# Patient Record
Sex: FEMALE | Race: WHITE | NOT HISPANIC OR LATINO | Employment: FULL TIME | URBAN - NONMETROPOLITAN AREA
[De-identification: names, ages, dates, MRNs, and addresses within clinical notes are randomized per-mention and may not be internally consistent; named-entity substitution may affect disease eponyms.]

---

## 2022-12-04 NOTE — PROGRESS NOTES
"Chief Complaint  Depression, anxiety, PTSD, and difficulty with concentration    Subjective          Alisa Saul presents to [facility] by herself for an initial evaluation.  The patient was referred by Eli Garcia, [facility] from [facility].    History of Present Illness: Alisa states, \"I have been on some form of antidepressant since 2017.  I have been on many different kinds: Lexapro, Zoloft, Effexor, BuSpar, Trintellix, and Wellbutrin.\"  Alisa tells me that it has been disheartening as medications have not been very effective.  She feels that her depression and anxiety have been worsening.  She feels her depression is more severe than anxiety at this time.  She notes severe symptoms of depression including daily depressed mood, anhedonia, difficulty with sleep, fatigue, increased appetite, feelings of guilt, decreased concentration, lack of motivation, lack of energy, avoiding tasks that require sustained effort, and passive suicidal ideations.  She adamantly denies any suicidal plan since 2017 but reports having thoughts such as, \"things would be easier if I was not here\" or \"it may be easier for people if I was not here.\"  She tells me that her last plan included taking multiple medications as a form of suicide.  She is in agreement to safety planning today.  She tells me she has neglected caring for herself in the past which has led to dental problems and poor vision. She is seeing providers now. She also notes that health problems such as back pain and suppurative hidradenitis are affecting her mood. She notes symptoms of moderate anxiety such as constant worry, difficulty controlling her worry, worrying about different things, difficulty relaxing, being easily annoyed or irritable, and feeling as if something awful may happen.  She notes episodes of panic \"a handful of times.\"  She tells me that it can be a combination of feeling anxious and being " overwhelmed.  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  She tells me her last panic episode was the summer.  She tells me they last for about 30 minutes and she feels exhausted afterwards.  She denies any alleviating factors but does try distraction.  She notes symptoms similar to PTSD such as experiencing death during the pandemic, dissociated thoughts, panic attacks, and hypervigilance.  She does not feel there is anything that she tries to avoid at this time.  She is starting graduate school next month and would like to improve her mental health prior to starting.  She is also in therapy with Eli Garcia at [facility].  She tells me that she ha difficulty with sleep since childhood.  She has tried multiple sleep aids including melatonin, Vistaril, Belsomra, trazodone, and Benadryl.  She tells me that she often takes an hour to 2 hours to fall asleep and uses her phone as a distraction.  She tells me that she gets about 4 hours of sleep on average as she wakes throughout the night.  She denies symptoms similar to azucena/hypomania but does note that there has been impulsivity with spending at times.  She tells me that she spends more when she is depressed to improve her mood.  She has had financial difficulties which required her mother to be provide assistance.  This leads to further guilt feelings.  Currently she is taking Wellbutrin XL and BuSpar.  She denies any side effects.  She denies any HI/AVH.    Past Psychiatric History: Alisa tells me that she began medications in 2017 by her primary care physician.  She reports taking medication such as Lexapro, Zoloft, Effexor, BuSpar, Trintellix, Wellbutrin, Vistaril, Belsomra, melatonin, trazodone, and Benadryl.  She [facility] with [facility] in 2021.  She denies any inpatient psychiatric hospitalizations.  She does report having a suicide plan in 2017 but denies any attempts.  She  "also reports fleeting, passive suicidal ideations at this time.  She identifies her mother and cats as protective against suicide.  She also agrees to safety planning.  She denies any self harming behaviors but notes lack of self care in the past.    Substance Use/Abuse: Alisa adamantly denies the current or former use of any illicit substances or nicotine.  She does drink alcohol occasionally.  She began at age 22.  Her use has not increased over time and has not been associated with any legal problems.  She denies any cravings for alcohol    Family Psychiatric History: Alisa notes that her father had depression and problems with addiction prior to her sister's birth 28 years ago.  Her mother has taken Wellbutrin in the past for smoking cessation and possibly mild depression.    Developmental History: Alisa was born and raised in [place], [place].  Her parents  when she was 4 or 5.  She has an older sister by 2 years.  She tells me that her mother raised her primarily after her parents .  She tells me that her father was not often present in her life and would often miss his set weekends with the children.  She tells me her relationship with him has been \"arnol in the past.\"  She tells me she used to be angry with her father but has tried to become more accepting of him and set boundaries.  She reports having the same difficulties with her sister with the need for boundaries.  He identifies her sister as having narcissistic personality.  She is trying to be closer with her mother and confide in her more about her mental health struggles.  She does feel her mother is supportive and she can talk with her about her mental health.  She has always been a good student in the past and enjoyed school.  She has a bachelor's of nursing and is pursuing a CRNA degree.  She will start school in a month.  She tells me that she has always been good with making and keeping friends.  She tells me that her " "friendships often feel \"one-sided and that she is more of a convenience.\"  She recently joined a dating amina and has dated in the past.  There was one relationship about a year ago that was difficult for Alisa after the break-up.  She denies any disciplinary problems in the past.    Social History: Alisa currently lives in [place], [place] by herself.  She has 3 cats and a dog.  She has no biological children.  She is single.  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  She moved from [place] in April 2022.  She enjoys watching movies, and TV, reading, listening to music, cooking occasionally, shopping, and spending time with her animals.  She is returning to school in January at [employer]. Her first few semesters are online.She denies the use of nicotine and reports the occasional use of alcohol.     Objective   Vital Signs:   /78   Pulse 82   Ht 175.3 cm (69\")   Wt 134 kg (295 lb)   BMI 43.56 kg/m²       PHQ-9 Score:   PHQ-9 Total Score: 24     Mental Status Exam:   Hygiene:   good  Cooperation:  Cooperative  Eye Contact:  Fair  Psychomotor Behavior:  Slow  Affect:  tearful  Mood: depressed  Speech:  Normal  Thought Process:  Goal directed and Linear  Thought Content:  Normal  Suicidal:  Suicidal Ideation and passive without intent or plan  Homicidal:  None  Hallucinations:  None  Delusion:  None  Memory:  Intact  Orientation:  Person, Place, Time and Situation  Reliability:  good  Insight:  Fair  Judgement:  Fair  Impulse Control:  Fair  Physical/Medical Issues:  Yes Hidradenitis suppurativa, chronic back pain, endometriosis, and arthritis      Current Medications:   Current Outpatient Medications   Medication Sig Dispense Refill   • buPROPion XL (WELLBUTRIN XL) 300 MG 24 hr tablet Take 300 mg by mouth Every Morning.     • busPIRone (BUSPAR) 10 MG tablet Take 10 mg by mouth 2 (Two) Times a Day.     • fluticasone (FLONASE) 50 MCG/ACT nasal spray 2 sprays " into the nostril(s) as directed by provider Daily.     • Humira Pen 40 MG/0.4ML Pen-injector Kit      • levocetirizine (XYZAL) 5 MG tablet      • Low-Ogestrel 0.3-30 MG-MCG per tablet TAKE 1 TABLET BY MOUTH 1 TIME EACH DAY.     • montelukast (SINGULAIR) 10 MG tablet      • spironolactone (ALDACTONE) 100 MG tablet 150 mg.     • TIZANIDINE HCL PO 4 mg.     • lamoTRIgine (LaMICtal) 25 MG tablet Take 1 tablet by mouth Every Night for 14 days, THEN 2 tablets Every Night for 14 days. 42 tablet 0   • propranolol (INDERAL) 10 MG tablet Take 1 tablet by mouth 2 (Two) Times a Day As Needed (anxiety). 60 tablet 2     No current facility-administered medications for this visit.   Physical Exam  Vitals and nursing note reviewed.   Constitutional:       Appearance: Normal appearance. She is well-developed. She is obese.   Musculoskeletal:         General: Normal range of motion.   Skin:     General: Skin is warm and dry.   Neurological:      Mental Status: She is alert and oriented to person, place, and time.   Psychiatric:         Attention and Perception: Attention normal.         Mood and Affect: Mood is depressed. Affect is tearful.         Speech: Speech normal.         Behavior: Behavior is slowed. Behavior is cooperative.         Thought Content: Thought content includes suicidal (passive without intent or plan) ideation.         Cognition and Memory: Cognition normal.         Judgment: Judgment is impulsive.        Result Review :                 Assessment and Plan    Diagnoses and all orders for this visit:    1. Moderate recurrent major depression (HCC) (Primary)  -     lamoTRIgine (LaMICtal) 25 MG tablet; Take 1 tablet by mouth Every Night for 14 days, THEN 2 tablets Every Night for 14 days.  Dispense: 42 tablet; Refill: 0    2. CRISTINA (generalized anxiety disorder)  -     propranolol (INDERAL) 10 MG tablet; Take 1 tablet by mouth 2 (Two) Times a Day As Needed (anxiety).  Dispense: 60 tablet; Refill: 2    3. Other  insomnia    4. Post traumatic stress disorder (PTSD)         Impression:  -This is an initial evaluation of the patient. Alisa is a , 27-year-old female who presents by herself for a medication evaluation.  Alisa has been experiencing symptoms of depression and anxiety since 2017.  She had a suicide plan at that time and began medication management.  She has been in therapy for greater than a year and feels her medications may need to be adjusted.  She notes symptoms similar to PTSD after nursing through a pandemic.  Alisa and I spent time discussing her symptoms.  She appeared to have symptoms similar to ADHD throughout the visit.  There appears to be difficulties with impulsivity, restlessness, poor focus, decreased concentration, problems with sleep, and difficulties with finding a medication regiment which has improved symptoms.  Alisa and I discussed how many psychiatric symptoms may overlap but she finds comfort in my mention of this diagnosis as she has discussed this with her therapist and primary care provider in the past.  She agrees to completing a CPT-3 test today.  She also noted benefits of starting Wellbutrin on her focus and concentration.  We also discussed her sleep issues and I suggested that she try a dual-action melatonin as Melatonin has been helpful for her in the past but she is taking higher than recommended doses.  I provided dosing and administration instructions.  She agrees. She is familiar with sleep hygiene but can also benefit from turning off her phone an hour before bedtime.  We discussed signing a release so I can communicate with her therapist, Eli.  She agrees.  I also recommended in the future reconsider making dietary changes as she seems to have several inflammatory processes that may benefit from a gluten-free or elimination diet.  She verbalizes understanding.  Eli and I discussed possible medication adjustments to improve symptoms at this time.   In particular, I suggested continuing Wellbutrin and BuSpar and adding lamotrigine and propranolol.  I explained the mechanism of action and purpose of these medications, as well as discussed risk versus benefits.  She verbalizes to monitor for rash and orthostatic hypotension with starting these medications.  She is in agreement to the treatment plan.  -Initiate lamotrigine 25 mg nightly for 2 weeks then increase to 50 mg nightly for depression and anxiety.  -Initiate propranolol 10 mg twice daily as needed for anxiety.  -Continue Wellbutrin 300 mg daily for depression.  -Continue BuSpar 10 mg twice daily for anxiety.  -Continue therapy.  -Obtain a CPT 3 test.  We will discuss results at next visit.  -Sign PATRICK for Eli Garcia, [facility].     TREATMENT PLAN/GOALS: Continue supportive psychotherapy efforts and medications as indicated. Treatment and medication options discussed during today's visit. Patient ackowledged and verbally consented to continue with current treatment plan and was educated on the importance of compliance with treatment and follow-up appointments.    MEDICATION ISSUES:    We discussed risks, benefits, and side effects of the above medications and the patient was agreeable with the plan. Patient was educated on the importance of compliance with treatment and follow-up appointments.  Patient is agreeable to call the office with any worsening of symptoms or onset of side effects. Patient is agreeable to call 911 or go to the nearest ER should he/she begin having SI/HI.      Counseled patient regarding multimodal approach with healthy nutrition, healthy sleep, regular physical activity, social activities, counseling, and medications.      Coping skills reviewed and encouraged positive framing of thoughts     Assisted patient in processing above session content; acknowledged and normalized patient's thoughts, feelings, and concerns.  Applied  positive coping skills and behavior management in  session.  Allowed patient to freely discuss issues without interruption or judgment. Provided safe, confidential environment to facilitate the development of positive therapeutic relationship and encourage open, honest communication. Assisted patient in identifying risk factors which would indicate the need for higher level of care including thoughts to harm self or others and/or self-harming behavior and encouraged patient to contact this office, call 911, or present to the nearest emergency room should any of these events occur. Discussed crisis intervention services and means to access.     MEDS ORDERED DURING VISIT:  New Medications Ordered This Visit   Medications   • lamoTRIgine (LaMICtal) 25 MG tablet     Sig: Take 1 tablet by mouth Every Night for 14 days, THEN 2 tablets Every Night for 14 days.     Dispense:  42 tablet     Refill:  0   • propranolol (INDERAL) 10 MG tablet     Sig: Take 1 tablet by mouth 2 (Two) Times a Day As Needed (anxiety).     Dispense:  60 tablet     Refill:  2           Follow Up   Return in about 4 weeks (around 1/2/2023) for Medication Check.    Patient was given instructions and counseling regarding her condition or for health maintenance advice. Please see specific information pulled into the AVS if appropriate.     This document has been electronically signed by THELMA Hubbard  December 5, 2022 13:34 EST      This document has been electronically signed by THELMA Henriquez, PMHNP-BC  December 5, 2022 13:34 EST    Part of this note may be an electronic transcription/translation of spoken language to printed text using the Dragon Dictation System.

## 2022-12-05 ENCOUNTER — OFFICE VISIT (OUTPATIENT)
Dept: PSYCHIATRY | Facility: CLINIC | Age: 27
End: 2022-12-05

## 2022-12-05 VITALS
HEART RATE: 82 BPM | HEIGHT: 69 IN | WEIGHT: 293 LBS | SYSTOLIC BLOOD PRESSURE: 104 MMHG | DIASTOLIC BLOOD PRESSURE: 78 MMHG | BODY MASS INDEX: 43.4 KG/M2

## 2022-12-05 DIAGNOSIS — F33.1 MODERATE RECURRENT MAJOR DEPRESSION: Primary | Chronic | ICD-10-CM

## 2022-12-05 DIAGNOSIS — F41.1 GAD (GENERALIZED ANXIETY DISORDER): Chronic | ICD-10-CM

## 2022-12-05 DIAGNOSIS — G47.09 OTHER INSOMNIA: Chronic | ICD-10-CM

## 2022-12-05 DIAGNOSIS — F43.10 POST TRAUMATIC STRESS DISORDER (PTSD): Chronic | ICD-10-CM

## 2022-12-05 PROBLEM — G47.00 INSOMNIA: Status: ACTIVE | Noted: 2022-09-23

## 2022-12-05 PROBLEM — F32.A DEPRESSION: Status: ACTIVE | Noted: 2022-09-23

## 2022-12-05 PROBLEM — N80.9 ENDOMETRIOSIS: Status: ACTIVE | Noted: 2022-09-23

## 2022-12-05 PROBLEM — R53.83 FATIGUE: Status: ACTIVE | Noted: 2022-09-23

## 2022-12-05 PROBLEM — E66.01 MORBID OBESITY WITH BODY MASS INDEX (BMI) OF 40.0 OR HIGHER: Status: ACTIVE | Noted: 2022-09-23

## 2022-12-05 PROBLEM — M54.50 LOW BACK PAIN: Status: ACTIVE | Noted: 2022-09-23

## 2022-12-05 PROBLEM — F41.9 ANXIETY: Status: ACTIVE | Noted: 2022-09-23

## 2022-12-05 PROBLEM — J02.9 SORE THROAT: Status: ACTIVE | Noted: 2022-09-23

## 2022-12-05 PROBLEM — J30.9 ALLERGIC RHINITIS: Status: ACTIVE | Noted: 2022-09-23

## 2022-12-05 PROBLEM — L73.2 HIDRADENITIS SUPPURATIVA: Status: ACTIVE | Noted: 2022-09-23

## 2022-12-05 PROBLEM — M19.90 ARTHRITIS: Status: ACTIVE | Noted: 2022-09-23

## 2022-12-05 PROCEDURE — 90792 PSYCH DIAG EVAL W/MED SRVCS: CPT | Performed by: NURSE PRACTITIONER

## 2022-12-05 RX ORDER — ADALIMUMAB 40MG/0.4ML
KIT SUBCUTANEOUS
COMMUNITY
Start: 2022-11-12

## 2022-12-05 RX ORDER — LEVOCETIRIZINE DIHYDROCHLORIDE 5 MG/1
TABLET, FILM COATED ORAL
COMMUNITY
End: 2023-01-09

## 2022-12-05 RX ORDER — FLUTICASONE PROPIONATE 50 MCG
2 SPRAY, SUSPENSION (ML) NASAL DAILY
COMMUNITY
End: 2023-02-08

## 2022-12-05 RX ORDER — LEVOCETIRIZINE DIHYDROCHLORIDE 5 MG/1
TABLET, FILM COATED ORAL
COMMUNITY
End: 2022-12-05 | Stop reason: SDUPTHER

## 2022-12-05 RX ORDER — BUPROPION HYDROCHLORIDE 300 MG/1
300 TABLET ORAL EVERY MORNING
COMMUNITY
Start: 2022-10-12 | End: 2023-01-02 | Stop reason: SDUPTHER

## 2022-12-05 RX ORDER — PROPRANOLOL HYDROCHLORIDE 10 MG/1
10 TABLET ORAL 2 TIMES DAILY PRN
Qty: 60 TABLET | Refills: 2 | Status: SHIPPED | OUTPATIENT
Start: 2022-12-05 | End: 2023-03-01

## 2022-12-05 RX ORDER — LAMOTRIGINE 25 MG/1
TABLET ORAL
Qty: 42 TABLET | Refills: 0 | Status: SHIPPED | OUTPATIENT
Start: 2022-12-05 | End: 2022-12-27

## 2022-12-05 RX ORDER — MONTELUKAST SODIUM 10 MG/1
TABLET ORAL
COMMUNITY
End: 2023-01-09

## 2022-12-05 RX ORDER — SPIRONOLACTONE 100 MG/1
150 TABLET, FILM COATED ORAL
COMMUNITY
End: 2023-01-09

## 2022-12-05 RX ORDER — NORGESTREL AND ETHINYL ESTRADIOL 0.3-0.03MG
KIT ORAL
COMMUNITY
Start: 2022-09-23

## 2022-12-05 RX ORDER — BUSPIRONE HYDROCHLORIDE 10 MG/1
10 TABLET ORAL 2 TIMES DAILY
COMMUNITY
Start: 2022-10-06 | End: 2023-01-09 | Stop reason: SDUPTHER

## 2022-12-25 DIAGNOSIS — F33.1 MODERATE RECURRENT MAJOR DEPRESSION: Chronic | ICD-10-CM

## 2022-12-27 RX ORDER — LAMOTRIGINE 100 MG/1
TABLET ORAL
Qty: 42 TABLET | Refills: 0 | Status: SHIPPED | OUTPATIENT
Start: 2022-12-27 | End: 2023-02-08 | Stop reason: SDUPTHER

## 2022-12-27 NOTE — TELEPHONE ENCOUNTER
Okay. I will send in the 100 mg tablets which she will take 1/2 tablet for a week and then increase to whole tablet.

## 2022-12-27 NOTE — TELEPHONE ENCOUNTER
Can we see how Alisa is doing at the 50 mg dose and if she is ready to increase once more in a week to 100 mg?

## 2023-01-02 DIAGNOSIS — F33.1 MODERATE RECURRENT MAJOR DEPRESSION: Primary | ICD-10-CM

## 2023-01-02 DIAGNOSIS — G47.09 OTHER INSOMNIA: ICD-10-CM

## 2023-01-02 RX ORDER — MIRTAZAPINE 15 MG/1
15 TABLET, FILM COATED ORAL NIGHTLY
Qty: 30 TABLET | Refills: 2 | Status: SHIPPED | OUTPATIENT
Start: 2023-01-02 | End: 2023-03-09

## 2023-01-02 RX ORDER — BUPROPION HYDROCHLORIDE 150 MG/1
150 TABLET ORAL EVERY MORNING
Qty: 30 TABLET | Refills: 2 | Status: SHIPPED | OUTPATIENT
Start: 2023-01-02 | End: 2023-03-09 | Stop reason: SDUPTHER

## 2023-01-08 NOTE — PROGRESS NOTES
Chief Complaint  Depression, anxiety, PTSD, and difficulty with concentration      Subjective          Alisa Saul presents to [facility] by herself for a follow up and medication check.    History of Present Illness: Alisa states, \"I have been okay.\"  Alisa tells me that she flies to [place] tomorrow for her graduate school orientation.  She will be there for 3 days.  She reports feelings of nervousness, being overwhelmed, and anxiety.\"  She has not been able to see a improvement with the addition of propranolol.  She continues to rate her anxiety as moderate.  She reports continuing to lack energy and motivation and focus.  She describes her depression as severe.  She endorses passive suicidal ideations but adamantly denies any intent or plan for suicide.  She completed a CPT 3 test which indicated a moderate likelihood of having a disorder characterized by symptoms of inattentiveness.  She remains motivated to make medication adjustments to improve her symptoms.  She continues in therapy with Eli Garcia.  Her notes were received and scanned into the media. She has tried: Lexapro, Zoloft, Effexor, BuSpar, Trintellix, and Wellbutrin. Currently she is taking Propranolol, Mirtazapine, Lamotrigine, Wellbutrin XL and BuSpar.  She denies any side effects.  She denies any HI/AVH.    Current Medications:   Current Outpatient Medications   Medication Sig Dispense Refill   • buPROPion XL (WELLBUTRIN XL) 150 MG 24 hr tablet Take 1 tablet by mouth Every Morning. 30 tablet 2   • busPIRone (BUSPAR) 15 MG tablet Take 1 tablet by mouth 2 (Two) Times a Day. 60 tablet 2   • fluticasone (FLONASE) 50 MCG/ACT nasal spray 2 sprays into the nostril(s) as directed by provider Daily.     • Humira Pen 40 MG/0.4ML Pen-injector Kit      • lamoTRIgine (LaMICtal) 100 MG tablet Take 0.5 tablets by mouth Every Night for 7 days, THEN 1 tablet Every Night for 23 days. 42 tablet 0   •  Low-Ogestrel 0.3-30 MG-MCG per tablet TAKE 1 TABLET BY MOUTH 1 TIME EACH DAY.     • mirtazapine (Remeron) 15 MG tablet Take 1 tablet by mouth Every Night. 30 tablet 2   • propranolol (INDERAL) 10 MG tablet Take 1 tablet by mouth 2 (Two) Times a Day As Needed (anxiety). 60 tablet 2   • TIZANIDINE HCL PO 4 mg.     • amphetamine-dextroamphetamine (Adderall) 10 MG tablet Take 1 tablet by mouth Daily for 7 days, THEN 1 tablet 2 (Two) Times a Day for 7 days. 21 tablet 0     No current facility-administered medications for this visit.         Objective   Vital Signs:   /72   Pulse 78   Ht 172.7 cm (68\")   Wt 136 kg (300 lb)   BMI 45.61 kg/m²     Physical Exam  Vitals and nursing note reviewed.   Constitutional:       Appearance: Normal appearance. She is well-developed.   Musculoskeletal:         General: Normal range of motion.   Skin:     General: Skin is warm and dry.   Neurological:      Mental Status: She is alert and oriented to person, place, and time.   Psychiatric:         Attention and Perception: Attention normal.         Mood and Affect: Mood is anxious and depressed.         Speech: Speech normal.         Behavior: Behavior normal. Behavior is cooperative.         Thought Content: Thought content includes suicidal (passive without intent or plan) ideation.         Cognition and Memory: Cognition normal.         Judgment: Judgment normal.        Result Review :     The following data was reviewed by: THELMA Hubbard on 01/09/2023:    BEHAVIORAL HEALTH - SCAN - CPT TEST (12/05/2022)           Assessment and Plan    Diagnoses and all orders for this visit:    1. ADHD (attention deficit hyperactivity disorder), inattentive type (Primary)  -     amphetamine-dextroamphetamine (Adderall) 10 MG tablet; Take 1 tablet by mouth Daily for 7 days, THEN 1 tablet 2 (Two) Times a Day for 7 days.  Dispense: 21 tablet; Refill: 0    2. CRISTINA (generalized anxiety disorder)  -     busPIRone (BUSPAR) 15 MG  tablet; Take 1 tablet by mouth 2 (Two) Times a Day.  Dispense: 60 tablet; Refill: 2    3. Medication management  -     Urine Drug Screen - Urine, Clean Catch; Future    4. Moderate recurrent major depression (HCC)    5. Post traumatic stress disorder (PTSD)         Mental Status Exam:   Hygiene:   good  Cooperation:  Cooperative  Eye Contact:  Good  Psychomotor Behavior:  Appropriate  Affect:  Appropriate  Mood: depressed and anxious  Speech:  Normal  Thought Process:  Goal directed and Linear  Thought Content:  Normal  Suicidal:  Suicidal Ideation and Passive without intent or plan  Homicidal:  None  Hallucinations:  None  Delusion:  None  Memory:  Intact  Orientation:  Person, Place, Time and Situation  Reliability:  good  Insight:  Good  Judgement:  Good  Impulse Control:  Good  Physical/Medical Issues:  Yes Hidradenitis suppurativa, chronic back pain, endometriosis, and arthritis      PHQ-9 Score:   PHQ-9 Total Score: 23    Impression/Plan:  -This is a follow up and medication check. Alisa continues to experience severe depression and moderate anxiety.  She is unsure of the medication changes made at last visit have been beneficial.  She is still motivated to continue with adjustments.  She has been taking 150 mg of Wellbutrin and 50 mg of lamotrigine and will be increasing Lamotrigine tomorrow.  She has been using propranolol but does not feel any effects.  She completed a CPT 3 test and was found to have a moderate likelihood of having a disorder characterized by symptoms of inattentiveness.  Therefore, we will begin treating her ADHD symptoms with a stimulant.  We did discuss noncontrolled versus controlled options and she feels the stimulant may be most beneficial for her.  We discussed using Adderall initially and changing to Vyvanse since she is scheduled to fly to [place].  The shorter acting medication may be more effective at managing symptoms.  She agrees.  She is aware of the risk of adding a  stimulant to her treatment including sudden cardiac death, dry mouth/constipation, insomnia, and decreased appetite to name a few.  She will continue with the current doses of her medications except we will increase BuSpar to help with symptoms of anxiety.  She verbalizes understanding.  She will continue therapy with Eli.  -Initiate Adderall 10 mg daily for a week and increase to twice daily for ADHD, inattentive type  -Decrease Mirtazapine to 7.5 mg nightly for insomnia.   -Increase BuSpar to 15 mg twice daily for anxiety.  -Continue lamotrigine 100 mg nightly for depression and anxiety  -Continue propranolol 10 mg twice daily as needed for anxiety.  -Continue Wellbutrin 150 mg daily for depression.  -Continue therapy.  -Obtain urine drug screen.  I am expecting the patient to be negative for all substances.  -The DESIRAE report, reviewed through PDMP, of the past 12 months were reviewed and is appropriate.  The patient/guardian reports taking the medication only as prescribed.  The patient/guardian denies any abuse or misuse of the medication.  The patient/guardian denies any other substance use or issues.  There are no apparent substance related issues.  The patient reports no side effects of the current medication usage.  The patient/guardian has reported significant improvement with medication usage and wishes to continue medication as prescribed.  The patient/guardian is appropriate to continue with current medication usage at this time.  Reinforced risks and side effects of medication usage, patient and/or guardian verbalize understanding in their own words and are in agreement with current plan.  -The patient has read and signed the [facility] Controlled Substance Contract. We discussed the risks and benefits of the use of controlled substances, including the risk of tolerance and drug dependence. Anorectic medications can be prescribed by one provider at a time and dispensed from one facility at a  time, they can only be taken as prescribed, and we are not obligated to refill them if lost or stolen. The refills are only during regular clinic hours. [place] report was pulled on patient, reviewed and found to be appropriate.       MEDS ORDERED DURING VISIT:  New Medications Ordered This Visit   Medications   • busPIRone (BUSPAR) 15 MG tablet     Sig: Take 1 tablet by mouth 2 (Two) Times a Day.     Dispense:  60 tablet     Refill:  2   • amphetamine-dextroamphetamine (Adderall) 10 MG tablet     Sig: Take 1 tablet by mouth Daily for 7 days, THEN 1 tablet 2 (Two) Times a Day for 7 days.     Dispense:  21 tablet     Refill:  0         Follow Up   Return in about 4 weeks (around 2/6/2023) for Medication Check.  Patient was given instructions and counseling regarding her condition or for health maintenance advice. Please see specific information pulled into the AVS if appropriate.       TREATMENT PLAN/GOALS: Continue supportive psychotherapy efforts and medications as indicated. Treatment and medication options discussed during today's visit. Patient acknowledged and verbally consented to continue with current treatment plan and was educated on the importance of compliance with treatment and follow-up appointments.    MEDICATION ISSUES:  Discussed medication options and treatment plan of prescribed medication as well as the risks, benefits, and side effects including potential falls, possible impaired driving and metabolic adversities among others. Patient is agreeable to call the office with any worsening of symptoms or onset of side effects. Patient is agreeable to call 911 or go to the nearest ER should he/she begin having SI/HI.        This document has been electronically signed by THELMA Henriquez, PMHNP-BC  January 9, 2023 12:01 EST    Part of this note may be an electronic transcription/translation of spoken language to printed text using the Dragon Dictation System.

## 2023-01-09 ENCOUNTER — LAB (OUTPATIENT)
Dept: LAB | Facility: HOSPITAL | Age: 28
End: 2023-01-09
Payer: COMMERCIAL

## 2023-01-09 ENCOUNTER — OFFICE VISIT (OUTPATIENT)
Dept: PSYCHIATRY | Facility: CLINIC | Age: 28
End: 2023-01-09
Payer: COMMERCIAL

## 2023-01-09 VITALS
DIASTOLIC BLOOD PRESSURE: 72 MMHG | BODY MASS INDEX: 44.41 KG/M2 | SYSTOLIC BLOOD PRESSURE: 118 MMHG | WEIGHT: 293 LBS | HEIGHT: 68 IN | HEART RATE: 78 BPM

## 2023-01-09 DIAGNOSIS — Z79.899 MEDICATION MANAGEMENT: ICD-10-CM

## 2023-01-09 DIAGNOSIS — F43.10 POST TRAUMATIC STRESS DISORDER (PTSD): Chronic | ICD-10-CM

## 2023-01-09 DIAGNOSIS — F41.1 GAD (GENERALIZED ANXIETY DISORDER): Chronic | ICD-10-CM

## 2023-01-09 DIAGNOSIS — F90.0 ADHD (ATTENTION DEFICIT HYPERACTIVITY DISORDER), INATTENTIVE TYPE: Primary | ICD-10-CM

## 2023-01-09 DIAGNOSIS — F33.1 MODERATE RECURRENT MAJOR DEPRESSION: Chronic | ICD-10-CM

## 2023-01-09 LAB
AMPHET+METHAMPHET UR QL: NEGATIVE
AMPHETAMINES UR QL: NEGATIVE
BARBITURATES UR QL SCN: NEGATIVE
BENZODIAZ UR QL SCN: NEGATIVE
BUPRENORPHINE SERPL-MCNC: NEGATIVE NG/ML
CANNABINOIDS SERPL QL: NEGATIVE
COCAINE UR QL: NEGATIVE
METHADONE UR QL SCN: NEGATIVE
OPIATES UR QL: NEGATIVE
OXYCODONE UR QL SCN: NEGATIVE
PCP UR QL SCN: NEGATIVE
PROPOXYPH UR QL: NEGATIVE
TRICYCLICS UR QL SCN: NEGATIVE

## 2023-01-09 PROCEDURE — 80306 DRUG TEST PRSMV INSTRMNT: CPT

## 2023-01-09 PROCEDURE — 99214 OFFICE O/P EST MOD 30 MIN: CPT | Performed by: NURSE PRACTITIONER

## 2023-01-09 RX ORDER — BUSPIRONE HYDROCHLORIDE 15 MG/1
15 TABLET ORAL 2 TIMES DAILY
Qty: 60 TABLET | Refills: 2 | Status: SHIPPED | OUTPATIENT
Start: 2023-01-09

## 2023-01-09 RX ORDER — DEXTROAMPHETAMINE SACCHARATE, AMPHETAMINE ASPARTATE, DEXTROAMPHETAMINE SULFATE AND AMPHETAMINE SULFATE 2.5; 2.5; 2.5; 2.5 MG/1; MG/1; MG/1; MG/1
TABLET ORAL
Qty: 21 TABLET | Refills: 0 | Status: SHIPPED | OUTPATIENT
Start: 2023-01-09 | End: 2023-01-23

## 2023-01-16 DIAGNOSIS — F33.1 MODERATE RECURRENT MAJOR DEPRESSION: Chronic | ICD-10-CM

## 2023-01-16 RX ORDER — LAMOTRIGINE 100 MG/1
TABLET ORAL
Qty: 42 TABLET | Refills: 0 | OUTPATIENT
Start: 2023-01-16 | End: 2023-02-15

## 2023-01-25 DIAGNOSIS — F90.0 ADHD (ATTENTION DEFICIT HYPERACTIVITY DISORDER), INATTENTIVE TYPE: ICD-10-CM

## 2023-01-25 RX ORDER — DEXTROAMPHETAMINE SACCHARATE, AMPHETAMINE ASPARTATE, DEXTROAMPHETAMINE SULFATE AND AMPHETAMINE SULFATE 2.5; 2.5; 2.5; 2.5 MG/1; MG/1; MG/1; MG/1
TABLET ORAL
Qty: 21 TABLET | Refills: 0 | Status: CANCELLED | OUTPATIENT
Start: 2023-01-25 | End: 2023-02-08

## 2023-01-25 RX ORDER — DEXTROAMPHETAMINE SACCHARATE, AMPHETAMINE ASPARTATE MONOHYDRATE, DEXTROAMPHETAMINE SULFATE AND AMPHETAMINE SULFATE 5; 5; 5; 5 MG/1; MG/1; MG/1; MG/1
20 CAPSULE, EXTENDED RELEASE ORAL DAILY
Qty: 30 CAPSULE | Refills: 0 | Status: SHIPPED | OUTPATIENT
Start: 2023-01-25 | End: 2023-02-27

## 2023-01-25 NOTE — TELEPHONE ENCOUNTER
Called pt to verify medication. She thinks it helped some but not much of a change very minimal. Please Advise    Rx Refill Note  Requested Prescriptions     Pending Prescriptions Disp Refills   • amphetamine-dextroamphetamine (Adderall) 10 MG tablet 21 tablet 0     Sig: Take 1 tablet by mouth Daily for 7 days, THEN 1 tablet 2 (Two) Times a Day for 7 days.      Last office visit with prescribing clinician: 1/9/2023   Last telemedicine visit with prescribing clinician: 2/8/2023   Next office visit with prescribing clinician: 2/8/2023                         Would you like a call back once the refill request has been completed: [] Yes [] No    If the office needs to give you a call back, can they leave a voicemail: [] Yes [] No    Lindsay Downing CMA  01/25/23, 14:12 EST

## 2023-01-25 NOTE — TELEPHONE ENCOUNTER
If she has seen small changes but no problems. I would suggest the next dose adjustment is to change to XR 20 mg. If she agrees, I will send to pharmacy.

## 2023-02-05 NOTE — PROGRESS NOTES
"This provider is located at [facility] ,[address], [address] in [place], [place],using a secure MyChart Video Visit through [employer]. Patient is being seen remotely via telehealth at their home address in [place], and stated they are in a secure environment for this session. The patient's condition being diagnosed/treated is appropriate for telemedicine. The provider identified herself as well as her credentials.   The patient, and/or patients guardian, consent to be seen remotely, and when consent is given they understand that the consent allows for patient identifiable information to be sent to a third party as needed.   They may refuse to be seen remotely at any time. The electronic data is encrypted and password protected, and the patient and/or guardian has been advised of the potential risks to privacy not withstanding such measures.    Chief Complaint  Depression, anxiety, PTSD, and difficulty with concentration      Lincoln Saul presents via MyChart Video through [employer] by herself for a follow up and medication check.    History of Present Illness: Alisa states, \"I am feeling overwhelmed.\" Alisa tells me she is taking graduate courses and working full-time. She is hoping to go part-time but is getting the run around from her manager about whether this is an option for her. She tells me the worry has been \"horrible\" and she is not doing the best emotionally because of the unknowns of the future. She is trying to establish a routine but has not found a schedule that works. She is not wanting to get out of bed in the mornings and finds herself only having enough time to dress, let dog out, and drive to work. She is not sleeping well and feels restless at night. She does not feels she is getting into a REM sleep until the morning. She may be getting 2-5 hours total. She is not taking Mirtazapine for fear she may oversleep. Her appetite is " increased from stress. She finds the Adderall provides some focus and ability to complete tasks but not as much as she was hoping.  She has tried: Lexapro, Zoloft, Effexor, BuSpar, Trintellix, and Wellbutrin. Currently she is taking Adderall XR. Propranolol, Mirtazapine, Lamotrigine, Wellbutrin XL and BuSpar.  She denies any side effects.  She denies any HI/AVH.    Current Medications:   Current Outpatient Medications   Medication Sig Dispense Refill   • amphetamine-dextroamphetamine XR (ADDERALL XR) 20 MG 24 hr capsule Take 1 capsule by mouth Daily 30 capsule 0   • buPROPion XL (WELLBUTRIN XL) 150 MG 24 hr tablet Take 1 tablet by mouth Every Morning. 30 tablet 2   • busPIRone (BUSPAR) 15 MG tablet Take 1 tablet by mouth 2 (Two) Times a Day. 60 tablet 2   • doxycycline (MONODOX) 100 MG capsule TAKE 1 CAPSULE BY MOUTH 2 TIMES AS NEEDED     • Humira Pen 40 MG/0.4ML Pen-injector Kit      • lamoTRIgine (LaMICtal) 100 MG tablet Take 1 tablet by mouth Every Night. 30 tablet 2   • levocetirizine (XYZAL) 5 MG tablet Take 5 mg by mouth.     • Low-Ogestrel 0.3-30 MG-MCG per tablet TAKE 1 TABLET BY MOUTH 1 TIME EACH DAY.     • mirtazapine (Remeron) 15 MG tablet Take 1 tablet by mouth Every Night. 30 tablet 2   • ondansetron ODT (ZOFRAN-ODT) 4 MG disintegrating tablet 1 TABLET BY MOUTH EVERY SIX HOURS AS NEEDED FOR NAUSEA VOMITING     • promethazine (PHENERGAN) 25 MG tablet Take 25 mg by mouth Every 6 (Six) Hours As Needed.     • Promethegan 25 MG suppository UNWRAP AND INSERT 1 SUPPOSITORY RECTALLY EVERY 6 HOURS AS NEEDED     • propranolol (INDERAL) 10 MG tablet Take 1 tablet by mouth 2 (Two) Times a Day As Needed (anxiety). 60 tablet 2   • TIZANIDINE HCL PO 4 mg.       No current facility-administered medications for this visit.         Objective   Vital Signs:   There were no vitals taken for this visit.    Physical Exam  Nursing note reviewed. Vitals reviewed: No vitals due to telehealth visit.   Constitutional:        Appearance: Normal appearance. She is well-developed. She is obese.   Neurological:      Mental Status: She is alert and oriented to person, place, and time.   Psychiatric:         Attention and Perception: Attention normal.         Mood and Affect: Mood is anxious and depressed. Affect is tearful (tries to hold back tears).         Speech: Speech normal.         Behavior: Behavior normal. Behavior is cooperative.         Thought Content: Thought content includes suicidal (passive without intent or plan) ideation.         Cognition and Memory: Cognition normal.         Judgment: Judgment normal.        Result Review :     The following data was reviewed by: THELMA Hubbard on 02/07/2023:         Assessment and Plan    Diagnoses and all orders for this visit:    1. Moderate recurrent major depression (HCC) (Primary)  -     lamoTRIgine (LaMICtal) 100 MG tablet; Take 1 tablet by mouth Every Night.  Dispense: 30 tablet; Refill: 2    2. ADHD (attention deficit hyperactivity disorder), inattentive type    3. CRISTINA (generalized anxiety disorder)    4. Post traumatic stress disorder (PTSD)         Mental Status Exam:   Hygiene:   good  Cooperation:  Cooperative  Eye Contact:  Good  Psychomotor Behavior:  Appropriate  Affect:  Restricted and Tries to hold back tears  Mood: depressed and anxious  Speech:  Normal  Thought Process:  Goal directed and Linear  Thought Content:  Normal  Suicidal:  Suicidal Ideation and Passive without intent or plan  Homicidal:  None  Hallucinations:  None  Delusion:  None  Memory:  Intact  Orientation:  Person, Place, Time and Situation  Reliability:  good  Insight:  Good  Judgement:  Good  Impulse Control:  Good  Physical/Medical Issues:  Yes Hidradenitis suppurativa, chronic back pain, endometriosis, and arthritis      PHQ-9 Score:   PHQ-9 Total Score:      Impression/Plan:  -This is a follow up and medication check. Alisa is struggling with feeling overwhelmed after returning  to graduate school and working full-time. She had planned on going part-time but is unsure if this will be possible now. She wants to take one day at a time and knows that school is a priority for her. She is not sleeping well and is struggling to get out of bed in the mornings. She is not using Mirtazapine because she fears she will oversleep but she is not sleeping well. I provided education about the effects of stress on the body and ways to combat stress with diet and exercise. I encouraged her to use 1/2 tablet of Mirtazapine nightly to improve sleep and we will meet back in 4 weeks to assess. She may need an increase of Adderall XR at next refill and will let this provider know.   -Continue Adderall XR 20 daily for ADHD, inattentive type. Patient has refills.   -Continue Mirtazapine 7.5 mg nightly for insomnia. Patient has refills.   -Continue BuSpar 15 mg twice daily for anxiety. Patient has refills.   -Continue lamotrigine 100 mg nightly for depression and anxiety  -Continue propranolol 10 mg twice daily as needed for anxiety. Patient has refills.   -Continue Wellbutrin 150 mg daily for depression. Patient has refills.   -Continue therapy.  -Last urine drug screen 01/09/2923.  Appropriate.  -The DESIRAE report, reviewed through PDMP, of the past 12 months were reviewed and is appropriate.  The patient/guardian reports taking the medication only as prescribed.  The patient/guardian denies any abuse or misuse of the medication.  The patient/guardian denies any other substance use or issues.  There are no apparent substance related issues.  The patient reports no side effects of the current medication usage.  The patient/guardian has reported significant improvement with medication usage and wishes to continue medication as prescribed.  The patient/guardian is appropriate to continue with current medication usage at this time.  Reinforced risks and side effects of medication usage, patient and/or guardian  verbalize understanding in their own words and are in agreement with current plan.        MEDS ORDERED DURING VISIT:  New Medications Ordered This Visit   Medications   • lamoTRIgine (LaMICtal) 100 MG tablet     Sig: Take 1 tablet by mouth Every Night.     Dispense:  30 tablet     Refill:  2         Follow Up   Return in about 4 weeks (around 3/8/2023) for Medication Check.  Patient was given instructions and counseling regarding her condition or for health maintenance advice. Please see specific information pulled into the AVS if appropriate.       TREATMENT PLAN/GOALS: Continue supportive psychotherapy efforts and medications as indicated. Treatment and medication options discussed during today's visit. Patient acknowledged and verbally consented to continue with current treatment plan and was educated on the importance of compliance with treatment and follow-up appointments.    MEDICATION ISSUES:  Discussed medication options and treatment plan of prescribed medication as well as the risks, benefits, and side effects including potential falls, possible impaired driving and metabolic adversities among others. Patient is agreeable to call the office with any worsening of symptoms or onset of side effects. Patient is agreeable to call 911 or go to the nearest ER should he/she begin having SI/HI.        This document has been electronically signed by THELMA Henriquez, PMHNP-BC  February 8, 2023 08:53 EST    Part of this note may be an electronic transcription/translation of spoken language to printed text using the Dragon Dictation System.

## 2023-02-08 ENCOUNTER — TELEMEDICINE (OUTPATIENT)
Dept: PSYCHIATRY | Facility: CLINIC | Age: 28
End: 2023-02-08
Payer: COMMERCIAL

## 2023-02-08 DIAGNOSIS — F41.1 GAD (GENERALIZED ANXIETY DISORDER): ICD-10-CM

## 2023-02-08 DIAGNOSIS — F43.10 POST TRAUMATIC STRESS DISORDER (PTSD): Chronic | ICD-10-CM

## 2023-02-08 DIAGNOSIS — F90.0 ADHD (ATTENTION DEFICIT HYPERACTIVITY DISORDER), INATTENTIVE TYPE: ICD-10-CM

## 2023-02-08 DIAGNOSIS — F33.1 MODERATE RECURRENT MAJOR DEPRESSION: Primary | Chronic | ICD-10-CM

## 2023-02-08 PROCEDURE — 99214 OFFICE O/P EST MOD 30 MIN: CPT | Performed by: NURSE PRACTITIONER

## 2023-02-08 RX ORDER — LEVOCETIRIZINE DIHYDROCHLORIDE 5 MG/1
5 TABLET, FILM COATED ORAL
COMMUNITY
Start: 2023-01-27

## 2023-02-08 RX ORDER — DOXYCYCLINE 100 MG/1
CAPSULE ORAL
COMMUNITY
Start: 2023-01-26

## 2023-02-08 RX ORDER — PROMETHAZINE HYDROCHLORIDE 25 MG/1
SUPPOSITORY RECTAL
COMMUNITY
Start: 2023-01-20

## 2023-02-08 RX ORDER — PROMETHAZINE HYDROCHLORIDE 25 MG/1
25 TABLET ORAL EVERY 6 HOURS PRN
COMMUNITY
Start: 2023-01-20

## 2023-02-08 RX ORDER — LAMOTRIGINE 100 MG/1
100 TABLET ORAL NIGHTLY
Qty: 30 TABLET | Refills: 2 | Status: SHIPPED | OUTPATIENT
Start: 2023-02-08 | End: 2023-03-09 | Stop reason: SDUPTHER

## 2023-02-08 RX ORDER — ONDANSETRON 4 MG/1
TABLET, ORALLY DISINTEGRATING ORAL
COMMUNITY
Start: 2022-11-15

## 2023-02-25 DIAGNOSIS — F90.0 ADHD (ATTENTION DEFICIT HYPERACTIVITY DISORDER), INATTENTIVE TYPE: ICD-10-CM

## 2023-02-25 RX ORDER — DEXTROAMPHETAMINE SACCHARATE, AMPHETAMINE ASPARTATE MONOHYDRATE, DEXTROAMPHETAMINE SULFATE AND AMPHETAMINE SULFATE 5; 5; 5; 5 MG/1; MG/1; MG/1; MG/1
20 CAPSULE, EXTENDED RELEASE ORAL DAILY
Qty: 30 CAPSULE | Refills: 0 | Status: CANCELLED | OUTPATIENT
Start: 2023-02-25 | End: 2024-02-25

## 2023-02-27 RX ORDER — DEXTROAMPHETAMINE SACCHARATE, AMPHETAMINE ASPARTATE MONOHYDRATE, DEXTROAMPHETAMINE SULFATE AND AMPHETAMINE SULFATE 6.25; 6.25; 6.25; 6.25 MG/1; MG/1; MG/1; MG/1
25 CAPSULE, EXTENDED RELEASE ORAL DAILY
Qty: 30 CAPSULE | Refills: 0 | Status: SHIPPED | OUTPATIENT
Start: 2023-02-27 | End: 2023-04-01 | Stop reason: SDUPTHER

## 2023-03-01 DIAGNOSIS — F41.1 GAD (GENERALIZED ANXIETY DISORDER): Chronic | ICD-10-CM

## 2023-03-01 RX ORDER — PROPRANOLOL HYDROCHLORIDE 10 MG/1
10 TABLET ORAL 2 TIMES DAILY PRN
Qty: 180 TABLET | Refills: 0 | Status: SHIPPED | OUTPATIENT
Start: 2023-03-01

## 2023-03-05 NOTE — PROGRESS NOTES
"This provider is located at [facility], Behavioral Health ,[address], [address] in [place], [place],using a secure We[employer] Video Visit through [employer]. Patient is being seen remotely via telehealth at their home address in [place], and stated they are in a secure environment for this session. The patient's condition being diagnosed/treated is appropriate for telemedicine. The provider identified herself as well as her credentials.   The patient, and/or patients guardian, consent to be seen remotely, and when consent is given they understand that the consent allows for patient identifiable information to be sent to a third party as needed.   They may refuse to be seen remotely at any time. The electronic data is encrypted and password protected, and the patient and/or guardian has been advised of the potential risks to privacy not withstanding such measures.    Chief Complaint  Depression, anxiety, PTSD, and difficulty with concentration      Subjective          Alisa Saul presents via MyChart Video through [employer] by herself for a follow up and medication check.    History of Present Illness: Alisa states, \"I am alright.\" Alisa tells me she is feeling very stressed recently as she is moving forward in school and will be moving sooner than expected to [place] and resigning from her job. She is very anxious and having trouble making decisions, focusing, and organizing thoughts. She is not sleeping well so she tried Mirtazapine more regularly and reports adversities. She felt \"spaced out, not focused, and more dissociated.\" She stop and is finally feeling better. She elected to use dual-action Melatonin for sleep and this is working well so far. She rates her depression a 5 out of 10 with 10 being the highest. She rates her anxiety a 7 out of 10 with 10 being the highest. She feels medications are helping and finally noticed Lamotrigine leveling out her mood. She would like more " motivation. She reports a flare up of acne always affects her mood and this happened two weeks ago. She reports compliance with medications. She has tried: Mirtazapine, Lexapro, Zoloft, Effexor, BuSpar, Trintellix, and Wellbutrin. Currently she is taking Adderall XR. Propranolol, Lamotrigine, Wellbutrin XL and BuSpar.  She denies any side effects.  She denies any SI/HI/AVH.    Current Medications:   Current Outpatient Medications   Medication Sig Dispense Refill   • amphetamine-dextroamphetamine XR (ADDERALL XR) 25 MG 24 hr capsule Take 1 capsule by mouth Daily 30 capsule 0   • buPROPion XL (WELLBUTRIN XL) 150 MG 24 hr tablet Take 1 tablet by mouth Every Morning. 30 tablet 2   • busPIRone (BUSPAR) 15 MG tablet Take 1 tablet by mouth 2 (Two) Times a Day. 60 tablet 2   • Humira Pen 40 MG/0.4ML Pen-injector Kit      • lamoTRIgine (LaMICtal) 150 MG tablet Take 1 tablet by mouth Every Night. 30 tablet 2   • levocetirizine (XYZAL) 5 MG tablet Take 1 tablet by mouth.     • Low-Ogestrel 0.3-30 MG-MCG per tablet TAKE 1 TABLET BY MOUTH 1 TIME EACH DAY.     • ondansetron ODT (ZOFRAN-ODT) 4 MG disintegrating tablet 1 TABLET BY MOUTH EVERY SIX HOURS AS NEEDED FOR NAUSEA VOMITING     • promethazine (PHENERGAN) 25 MG tablet Take 1 tablet by mouth Every 6 (Six) Hours As Needed.     • Promethegan 25 MG suppository UNWRAP AND INSERT 1 SUPPOSITORY RECTALLY EVERY 6 HOURS AS NEEDED     • propranolol (INDERAL) 10 MG tablet TAKE 1 TABLET BY MOUTH 2 (TWO) TIMES A DAY AS NEEDED (ANXIETY). 180 tablet 0   • TIZANIDINE HCL PO 4 mg.     • doxycycline (MONODOX) 100 MG capsule TAKE 1 CAPSULE BY MOUTH 2 TIMES AS NEEDED       No current facility-administered medications for this visit.         Objective   Vital Signs:   There were no vitals taken for this visit.    Physical Exam  Nursing note reviewed. Vitals reviewed: No vitals due to telehealth visit.   Constitutional:       Appearance: Normal appearance. She is well-developed. She is obese.    Neurological:      Mental Status: She is alert and oriented to person, place, and time.   Psychiatric:         Attention and Perception: Attention normal.         Mood and Affect: Mood is anxious and depressed. Affect is tearful (tries to hold back tears).         Speech: Speech normal.         Behavior: Behavior normal. Behavior is cooperative.         Thought Content: Thought content normal.         Cognition and Memory: Cognition normal.         Judgment: Judgment normal.        Result Review :     The following data was reviewed by: THELMA Hubbard on 03/09/2023:         Assessment and Plan    Diagnoses and all orders for this visit:    1. CRISTINA (generalized anxiety disorder) (Primary)    2. Moderate recurrent major depression (HCC)  -     lamoTRIgine (LaMICtal) 150 MG tablet; Take 1 tablet by mouth Every Night.  Dispense: 30 tablet; Refill: 2  -     buPROPion XL (WELLBUTRIN XL) 150 MG 24 hr tablet; Take 1 tablet by mouth Every Morning.  Dispense: 30 tablet; Refill: 2    3. ADHD (attention deficit hyperactivity disorder), inattentive type    4. Post traumatic stress disorder (PTSD)    5. Other insomnia         Mental Status Exam:   Hygiene:   good  Cooperation:  Cooperative  Eye Contact:  Good  Psychomotor Behavior:  Appropriate  Affect:  Restricted and Tries to hold back tears  Mood: depressed and anxious  Speech:  Normal  Thought Process:  Goal directed and Linear  Thought Content:  Normal  Suicidal:  None  Homicidal:  None  Hallucinations:  None  Delusion:  None  Memory:  Intact  Orientation:  Person, Place, Time and Situation  Reliability:  good  Insight:  Good  Judgement:  Good  Impulse Control:  Good  Physical/Medical Issues:  Yes Hidradenitis suppurativa, chronic back pain, endometriosis, and arthritis      PHQ-9 Score:   PHQ-9 Total Score:      Impression/Plan:  -This is a follow up and medication check. Alisa reports ongoing depression and anxiety. Stressors are significant and  interrupting her ability to focus, make decisions, and organize. She needs to clean her home, pack to move, work, and do homework. She is feeling overwhemed and behind. She had friends offer to help and she is considering this as an option. I also suggested finding a person to help her clean and organize before the move. She may consider this. I provided education on the effects of anxiety on executive functioning and encouraged her to use her support systems during this trying time. She is not going to use Mirtazapine for sleep and will use Melatonin. She is finding Lamotrigine to be helpful so I will increase to 150 mg per day. She agrees. We will meet back every 4 weeks until her move to [place]. She is aware she must be present in [place] to continue care.   -Stop Mirtazapine due to adverse effects.   -Increase lamotrigine to 150 mg nightly for depression and anxiety.  -Continue Adderall XR 20 daily for ADHD, inattentive type. Patient has refills.   -Continue BuSpar 15 mg twice daily for anxiety. Patient has refills.   -Continue propranolol 10 mg twice daily as needed for anxiety. Patient has refills.   -Continue Wellbutrin 150 mg daily for depression.   -Continue therapy.  -Last urine drug screen 01/09/2923.  Appropriate.  -The DESIRAE report, reviewed through PDMP, of the past 12 months were reviewed and is appropriate.  The patient/guardian reports taking the medication only as prescribed.  The patient/guardian denies any abuse or misuse of the medication.  The patient/guardian denies any other substance use or issues.  There are no apparent substance related issues.  The patient reports no side effects of the current medication usage.  The patient/guardian has reported significant improvement with medication usage and wishes to continue medication as prescribed.  The patient/guardian is appropriate to continue with current medication usage at this time.  Reinforced risks and side effects of medication usage,  patient and/or guardian verbalize understanding in their own words and are in agreement with current plan.        MEDS ORDERED DURING VISIT:  New Medications Ordered This Visit   Medications   • lamoTRIgine (LaMICtal) 150 MG tablet     Sig: Take 1 tablet by mouth Every Night.     Dispense:  30 tablet     Refill:  2   • buPROPion XL (WELLBUTRIN XL) 150 MG 24 hr tablet     Sig: Take 1 tablet by mouth Every Morning.     Dispense:  30 tablet     Refill:  2         Follow Up   Return in about 4 weeks (around 4/6/2023) for Medication Check.  Patient was given instructions and counseling regarding her condition or for health maintenance advice. Please see specific information pulled into the AVS if appropriate.       TREATMENT PLAN/GOALS: Continue supportive psychotherapy efforts and medications as indicated. Treatment and medication options discussed during today's visit. Patient acknowledged and verbally consented to continue with current treatment plan and was educated on the importance of compliance with treatment and follow-up appointments.    MEDICATION ISSUES:  Discussed medication options and treatment plan of prescribed medication as well as the risks, benefits, and side effects including potential falls, possible impaired driving and metabolic adversities among others. Patient is agreeable to call the office with any worsening of symptoms or onset of side effects. Patient is agreeable to call 911 or go to the nearest ER should he/she begin having SI/HI.        This document has been electronically signed by THELMA Henriquez, PMHNP-BC  March 9, 2023 12:21 EST    Part of this note may be an electronic transcription/translation of spoken language to printed text using the Dragon Dictation System.

## 2023-03-09 ENCOUNTER — TELEMEDICINE (OUTPATIENT)
Dept: PSYCHIATRY | Facility: CLINIC | Age: 28
End: 2023-03-09
Payer: COMMERCIAL

## 2023-03-09 DIAGNOSIS — F90.0 ADHD (ATTENTION DEFICIT HYPERACTIVITY DISORDER), INATTENTIVE TYPE: ICD-10-CM

## 2023-03-09 DIAGNOSIS — F33.1 MODERATE RECURRENT MAJOR DEPRESSION: Chronic | ICD-10-CM

## 2023-03-09 DIAGNOSIS — F41.1 GAD (GENERALIZED ANXIETY DISORDER): Primary | Chronic | ICD-10-CM

## 2023-03-09 DIAGNOSIS — G47.09 OTHER INSOMNIA: Chronic | ICD-10-CM

## 2023-03-09 DIAGNOSIS — F43.10 POST TRAUMATIC STRESS DISORDER (PTSD): Chronic | ICD-10-CM

## 2023-03-09 PROCEDURE — 99214 OFFICE O/P EST MOD 30 MIN: CPT | Performed by: NURSE PRACTITIONER

## 2023-03-09 RX ORDER — BUPROPION HYDROCHLORIDE 150 MG/1
150 TABLET ORAL EVERY MORNING
Qty: 30 TABLET | Refills: 2 | Status: SHIPPED | OUTPATIENT
Start: 2023-03-09

## 2023-03-09 RX ORDER — LAMOTRIGINE 150 MG/1
150 TABLET ORAL NIGHTLY
Qty: 30 TABLET | Refills: 2 | Status: SHIPPED | OUTPATIENT
Start: 2023-03-09

## 2023-04-01 DIAGNOSIS — F90.0 ADHD (ATTENTION DEFICIT HYPERACTIVITY DISORDER), INATTENTIVE TYPE: ICD-10-CM

## 2023-04-04 RX ORDER — DEXTROAMPHETAMINE SACCHARATE, AMPHETAMINE ASPARTATE MONOHYDRATE, DEXTROAMPHETAMINE SULFATE AND AMPHETAMINE SULFATE 6.25; 6.25; 6.25; 6.25 MG/1; MG/1; MG/1; MG/1
25 CAPSULE, EXTENDED RELEASE ORAL DAILY
Qty: 30 CAPSULE | Refills: 0 | Status: SHIPPED | OUTPATIENT
Start: 2023-04-04 | End: 2023-04-12

## 2023-04-10 NOTE — PROGRESS NOTES
"This provider is located at [facility], Behavioral Health ,[address], [address] in [place], [place],using a secure MyChart Video Visit through [employer]. Patient is being seen remotely via telehealth at their home address in [place], and stated they are in a secure environment for this session. The patient's condition being diagnosed/treated is appropriate for telemedicine. The provider identified herself as well as her credentials.   The patient, and/or patients guardian, consent to be seen remotely, and when consent is given they understand that the consent allows for patient identifiable information to be sent to a third party as needed.   They may refuse to be seen remotely at any time. The electronic data is encrypted and password protected, and the patient and/or guardian has been advised of the potential risks to privacy not withstanding such measures.    Chief Complaint  Depression, anxiety, PTSD, and difficulty with concentration      Subjective          Alisa Saul presents via MyChart Video through [employer] by herself for a follow up and medication check.    History of Present Illness: Alisa states, \"I've been better. I'm not doing super well.\" She says that she feels overwhelmed with school but also moving. She is in the process of moving to another city. She says that she just hasn't felt good. She feels down. She has trouble focusing. She says that her memory is bad and she is having trouble with concentration. She rates her depression a 7 or 8 on a scale of 0-10. She says that her anxiety feels like it is what is should be for all that is going on. She is in school, working full time as a nurse, and moving. She reports having mood swings. She reports not sleeping enough. She reports having negative thoughts about herself and guilt feelings, but denies suicidal ideations or thoughts of self-harm. She has took Benadryl a \"few times\" in the past 2 weeks for sleep. She " "reports she has also took Melatonin a few times, also for sleep. She has been taking Singulair for allergies. She is having issues with seasonal allergies, as well as being allergic to her cat. She reports taking propanolol every day before work. She reports that her appetite is good. She was tearful at times during the visit. She also expressed that she has not asked for any help with packing/moving, even though her friends have offered, but she is starting to accept help when offered. She reports that she called in to work today, but feels guilty. She states that she called in because of feeling overwhelmed. She also expressed that she feels bad because she missed a group project assignment on Monday with some girls from school, but she completely forgot about it. She endorses guiltfeelings and stated that the \"girls\" were very upset. She reports compliance with medications. She has tried: Mirtazapine, Lexapro, Zoloft, Effexor, BuSpar, Trintellix, and Wellbutrin. Currently she is taking Adderall XR. Propranolol, Lamotrigine, Wellbutrin XL and BuSpar.  She denies any side effects.  She denies any SI/HI/AVH.    Current Medications:   Current Outpatient Medications   Medication Sig Dispense Refill   • buPROPion XL (WELLBUTRIN XL) 150 MG 24 hr tablet Take 1 tablet by mouth Every Morning. 30 tablet 2   • busPIRone (BUSPAR) 15 MG tablet Take 1 tablet by mouth 2 (Two) Times a Day. 60 tablet 2   • Humira Pen 40 MG/0.4ML Pen-injector Kit      • lamoTRIgine (LaMICtal) 200 MG tablet Take 1 tablet by mouth Every Night. 30 tablet 2   • levocetirizine (XYZAL) 5 MG tablet Take 1 tablet by mouth.     • Low-Ogestrel 0.3-30 MG-MCG per tablet TAKE 1 TABLET BY MOUTH 1 TIME EACH DAY.     • montelukast (SINGULAIR) 10 MG tablet Take 1 tablet by mouth Every Night.     • propranolol (INDERAL) 10 MG tablet TAKE 1 TABLET BY MOUTH 2 (TWO) TIMES A DAY AS NEEDED (ANXIETY). 180 tablet 0   • TIZANIDINE HCL PO 4 mg.     • doxycycline (MONODOX) " 100 MG capsule TAKE 1 CAPSULE BY MOUTH 2 TIMES AS NEEDED (Patient not taking: Reported on 4/12/2023)     • lisdexamfetamine (Vyvanse) 60 MG capsule Take 1 capsule by mouth Every Morning 30 capsule 0   • ondansetron ODT (ZOFRAN-ODT) 4 MG disintegrating tablet 1 TABLET BY MOUTH EVERY SIX HOURS AS NEEDED FOR NAUSEA VOMITING (Patient not taking: Reported on 4/12/2023)     • promethazine (PHENERGAN) 25 MG tablet Take 1 tablet by mouth Every 6 (Six) Hours As Needed. (Patient not taking: Reported on 4/12/2023)     • Promethegan 25 MG suppository UNWRAP AND INSERT 1 SUPPOSITORY RECTALLY EVERY 6 HOURS AS NEEDED (Patient not taking: Reported on 4/12/2023)       No current facility-administered medications for this visit.         Objective   Vital Signs:   There were no vitals taken for this visit.    Physical Exam  Nursing note reviewed. Vitals reviewed: No vitals due to telehealth visit.   Constitutional:       Appearance: Normal appearance. She is well-developed. She is obese.   Neurological:      Mental Status: She is alert and oriented to person, place, and time.   Psychiatric:         Attention and Perception: Attention and perception normal.         Mood and Affect: Mood is anxious and depressed. Affect is tearful (tries to hold back tears).         Speech: Speech normal.         Behavior: Behavior normal. Behavior is cooperative.         Thought Content: Thought content normal.         Cognition and Memory: Cognition normal. She exhibits impaired recent memory.         Judgment: Judgment normal.        Result Review :     The following data was reviewed by: THELMA Hubbard on 04/12/2023:         Assessment and Plan    Diagnoses and all orders for this visit:    1. Moderate recurrent major depression (Primary)  -     lamoTRIgine (LaMICtal) 200 MG tablet; Take 1 tablet by mouth Every Night.  Dispense: 30 tablet; Refill: 2    2. CRISTINA (generalized anxiety disorder)  -     busPIRone (BUSPAR) 15 MG tablet;  "Take 1 tablet by mouth 2 (Two) Times a Day.  Dispense: 60 tablet; Refill: 2    3. ADHD (attention deficit hyperactivity disorder), inattentive type  -     lisdexamfetamine (Vyvanse) 60 MG capsule; Take 1 capsule by mouth Every Morning  Dispense: 30 capsule; Refill: 0    4. Post traumatic stress disorder (PTSD)    5. Other insomnia         Mental Status Exam:   Hygiene:   good  Cooperation:  Cooperative  Eye Contact:  Good  Psychomotor Behavior:  Appropriate  Affect:  Restricted and Tries to hold back tears  Mood: depressed and anxious  Speech:  Normal  Thought Process:  Goal directed and Linear  Thought Content:  Normal  Suicidal:  None  Homicidal:  None  Hallucinations:  None  Delusion:  None  Memory:  Intact  Orientation:  Person, Place, Time and Situation  Reliability:  good  Insight:  Good  Judgement:  Good  Impulse Control:  Good  Physical/Medical Issues:  Yes Hidradenitis suppurativa, chronic back pain, endometriosis, and arthritis      PHQ-9 Score:   PHQ-9 Total Score:      Impression/Plan:  -This is a follow up and medication check. Alisa reports feeling more down, rating depression 7 or 8 on scale of 0-10. She has trouble with memory, stating she has a \"bad memory.\" She feels that her anxiety is in proportion to what she is going through. She feels that the Adderall has helped her anxiety more than anything. She has not been sleeping enough and having more negative thoughts. Discussed asking for help and using her resources for packing and moving. She said that her friends have been offering to help and she is trying to let them help if offered. Discussed the possibility of Singulair causing depression as a side effect or Propranolol. Discussed increasing Wellbutrin and lamotrigine for depression. Discussed side effects and effectiveness. Patient advised to call with any questions, concerns, or adverse effects with medication changes. Talked with patient about changing from Adderall to Vyvanse for ADHD. " Discussed Vyvanse being smoother and a more natural feeling. Pt verbalized understanding.  -Stop taking Adderall XR 20 daily for ADHD, inattentive type.    -Initiate Vyvanse 60 mg daily for ADHD. Prescription sent to pharmacy.  -Increase lamotrigine to 200 mg nightly for depression and anxiety.  Prescription sent to pharmacy.  -Increase Wellbutrin to 300 mg daily for depression. Prescription sent to pharmacy.  -Continue BuSpar 15 mg twice daily for anxiety. Patient has refills.   -Continue propranolol 10 mg twice daily as needed for anxiety.   Patient has refills.   -Continue therapy.  - Follow up visit with medication management 3 weeks.  -Last urine drug screen 01/09/2923.  Appropriate.  -The DESIRAE report, reviewed through PDMP, of the past 12 months were reviewed and is appropriate.  The patient/guardian reports taking the medication only as prescribed.  The patient/guardian denies any abuse or misuse of the medication.  The patient/guardian denies any other substance use or issues.  There are no apparent substance related issues.  The patient reports no side effects of the current medication usage.  The patient/guardian has reported significant improvement with medication usage and wishes to continue medication as prescribed.  The patient/guardian is appropriate to continue with current medication usage at this time.  Reinforced risks and side effects of medication usage, patient and/or guardian verbalize understanding in their own words and are in agreement with current plan.        MEDS ORDERED DURING VISIT:  New Medications Ordered This Visit   Medications   • busPIRone (BUSPAR) 15 MG tablet     Sig: Take 1 tablet by mouth 2 (Two) Times a Day.     Dispense:  60 tablet     Refill:  2   • lamoTRIgine (LaMICtal) 200 MG tablet     Sig: Take 1 tablet by mouth Every Night.     Dispense:  30 tablet     Refill:  2   • lisdexamfetamine (Vyvanse) 60 MG capsule     Sig: Take 1 capsule by mouth Every Morning      Dispense:  30 capsule     Refill:  0         Follow Up   Return in about 3 weeks (around 5/3/2023) for Medication Check, Next scheduled follow up, Medication management.  Patient was given instructions and counseling regarding her condition or for health maintenance advice. Please see specific information pulled into the AVS if appropriate.       TREATMENT PLAN/GOALS: Continue supportive psychotherapy efforts and medications as indicated. Treatment and medication options discussed during today's visit. Patient acknowledged and verbally consented to continue with current treatment plan and was educated on the importance of compliance with treatment and follow-up appointments.    MEDICATION ISSUES:  Discussed medication options and treatment plan of prescribed medication as well as the risks, benefits, and side effects including potential falls, possible impaired driving and metabolic adversities among others. Patient is agreeable to call the office with any worsening of symptoms or onset of side effects. Patient is agreeable to call 911 or go to the nearest ER should he/she begin having SI/HI.        This document has been electronically signed by THELMA Henriquez, PMHNP-BC  April 12, 2023 11:55 EDT    Part of this note may be an electronic transcription/translation of spoken language to printed text using the Dragon Dictation System.    This encounter note was scribed for THELMA Hubbard  by Angie Mcgrath, student NP.  I, THELMA Hubbard , personally performed the services described in this documentation as scribed by the above named individual in my presence, and it is both accurate and complete.     04/12/2023  11:55 EDT

## 2023-04-12 ENCOUNTER — TELEMEDICINE (OUTPATIENT)
Dept: PSYCHIATRY | Facility: CLINIC | Age: 28
End: 2023-04-12
Payer: COMMERCIAL

## 2023-04-12 DIAGNOSIS — F90.0 ADHD (ATTENTION DEFICIT HYPERACTIVITY DISORDER), INATTENTIVE TYPE: ICD-10-CM

## 2023-04-12 DIAGNOSIS — F41.1 GAD (GENERALIZED ANXIETY DISORDER): Chronic | ICD-10-CM

## 2023-04-12 DIAGNOSIS — G47.09 OTHER INSOMNIA: Chronic | ICD-10-CM

## 2023-04-12 DIAGNOSIS — F33.1 MODERATE RECURRENT MAJOR DEPRESSION: Primary | Chronic | ICD-10-CM

## 2023-04-12 DIAGNOSIS — F43.10 POST TRAUMATIC STRESS DISORDER (PTSD): Chronic | ICD-10-CM

## 2023-04-12 PROCEDURE — 99214 OFFICE O/P EST MOD 30 MIN: CPT | Performed by: NURSE PRACTITIONER

## 2023-04-12 RX ORDER — LAMOTRIGINE 200 MG/1
200 TABLET ORAL NIGHTLY
Qty: 30 TABLET | Refills: 2 | Status: SHIPPED | OUTPATIENT
Start: 2023-04-12

## 2023-04-12 RX ORDER — MONTELUKAST SODIUM 10 MG/1
10 TABLET ORAL NIGHTLY
COMMUNITY
Start: 2023-04-03

## 2023-04-12 RX ORDER — BUSPIRONE HYDROCHLORIDE 15 MG/1
15 TABLET ORAL 2 TIMES DAILY
Qty: 60 TABLET | Refills: 2 | Status: SHIPPED | OUTPATIENT
Start: 2023-04-12

## 2023-04-23 DIAGNOSIS — F33.1 MODERATE RECURRENT MAJOR DEPRESSION: Chronic | ICD-10-CM

## 2023-04-24 RX ORDER — LAMOTRIGINE 200 MG/1
TABLET ORAL
Qty: 90 TABLET | Refills: 1 | Status: SHIPPED | OUTPATIENT
Start: 2023-04-24

## 2023-04-24 RX ORDER — LAMOTRIGINE 100 MG/1
TABLET ORAL
Qty: 90 TABLET | OUTPATIENT
Start: 2023-04-24

## 2023-04-24 RX ORDER — LAMOTRIGINE 150 MG/1
TABLET ORAL
Qty: 90 TABLET | OUTPATIENT
Start: 2023-04-24

## 2023-04-30 NOTE — PROGRESS NOTES
"This provider is located at [facility], Behavioral Health ,[address], [address] in [place], [place],using a secure [employer] Video Visit through [employer]. Patient is being seen remotely via telehealth at their home address in [place], and stated they are in a secure environment for this session. The patient's condition being diagnosed/treated is appropriate for telemedicine. The provider identified herself as well as her credentials.   The patient, and/or patients guardian, consent to be seen remotely, and when consent is given they understand that the consent allows for patient identifiable information to be sent to a third party as needed.   They may refuse to be seen remotely at any time. The electronic data is encrypted and password protected, and the patient and/or guardian has been advised of the potential risks to privacy not withstanding such measures.    Chief Complaint  Depression, anxiety, PTSD, and difficulty with concentration      Subjective          Alisa Saul presents via MyChart Video through [employer] by herself for a follow up and medication check.    History of Present Illness: Alisa states, \"I have been busy.\" Alisa tells me she completed her last day of work Monday. She is busy completing school assignments and preparing to move to [place] next week. She continues to have the support of her family. She elected to stop therapy while she is in [place] and may continue in the future. She tells me she felt relived after inforrning her therapist since her mother is paying. She tells me her mood has been low and was very low during her last therapy session and she did not find it to be helpful. She reports passive suicidal ideations with thoughts such as \"maybe it would be better.\" She reports the stress catching up with her and she has been panicked recently. She tries to distract herself. If she catches it early, deep breathing helps her. She tries to stop her " worry and compartmentalizes when she is overly anxious. She is sleeping better. She reports poor appetite. She feels fatigue throughout the day. She feels her focus, concentration, and attention is doing better since the change to Vyvanse. She saw her PCP before leaving for concerns of possible gastric ulcer and was prescribed Protonix.  She reports compliance with medications. She has tried: Mirtazapine, Lexapro, Zoloft, Effexor, BuSpar, Trintellix, and Wellbutrin. Currently, she is taking Vyvanse, Propranolol, Lamotrigine, Wellbutrin XL and BuSpar.  She denies any side effects.  She denies any SI/HI/AVH.    Current Medications:   Current Outpatient Medications   Medication Sig Dispense Refill   • albuterol sulfate  (90 Base) MCG/ACT inhaler Inhale 2 puffs.     • buPROPion XL (WELLBUTRIN XL) 300 MG 24 hr tablet Take 1 tablet by mouth Every Morning. 30 tablet 2   • busPIRone (BUSPAR) 15 MG tablet Take 1 tablet by mouth 2 (Two) Times a Day. 60 tablet 2   • doxycycline (MONODOX) 100 MG capsule      • Humira Pen 40 MG/0.4ML Pen-injector Kit      • lamoTRIgine (LaMICtal) 200 MG tablet TAKE 1 TABLET BY MOUTH EVERY DAY AT NIGHT 90 tablet 1   • levocetirizine (XYZAL) 5 MG tablet Take 1 tablet by mouth.     • Low-Ogestrel 0.3-30 MG-MCG per tablet TAKE 1 TABLET BY MOUTH 1 TIME EACH DAY.     • montelukast (SINGULAIR) 10 MG tablet Take 1 tablet by mouth Every Night.     • ondansetron ODT (ZOFRAN-ODT) 4 MG disintegrating tablet      • pantoprazole (PROTONIX) 40 MG EC tablet Take 1 tablet by mouth.     • promethazine (PHENERGAN) 25 MG tablet Take 1 tablet by mouth Every 6 (Six) Hours As Needed.     • Promethegan 25 MG suppository      • citalopram (CeleXA) 10 MG tablet Take 1 tablet by mouth Daily for 14 days, THEN 2 tablets Daily for 14 days. 42 tablet 0   • lisdexamfetamine (Vyvanse) 70 MG capsule Take 1 capsule by mouth Every Morning 30 capsule 0   • TIZANIDINE HCL PO 4 mg. (Patient not taking: Reported on 5/3/2023)        No current facility-administered medications for this visit.         Objective   Vital Signs:   There were no vitals taken for this visit.    Physical Exam  Nursing note reviewed. Vitals reviewed: No vitals due to telehealth visit.   Constitutional:       Appearance: Normal appearance. She is well-developed. She is obese.   Neurological:      Mental Status: She is alert and oriented to person, place, and time.   Psychiatric:         Attention and Perception: Attention and perception normal.         Mood and Affect: Mood is anxious and depressed. Affect is tearful (tries to hold back tears).         Speech: Speech normal.         Behavior: Behavior normal. Behavior is cooperative.         Thought Content: Thought content includes suicidal (passive without intent or plan) ideation.         Cognition and Memory: Cognition and memory normal.         Judgment: Judgment normal.        Result Review :     The following data was reviewed by: THELMA Hubbard on 05/03/2023:         Assessment and Plan    Diagnoses and all orders for this visit:    1. ADHD (attention deficit hyperactivity disorder), inattentive type (Primary)  -     lisdexamfetamine (Vyvanse) 70 MG capsule; Take 1 capsule by mouth Every Morning  Dispense: 30 capsule; Refill: 0    2. CRISTINA (generalized anxiety disorder)  -     citalopram (CeleXA) 10 MG tablet; Take 1 tablet by mouth Daily for 14 days, THEN 2 tablets Daily for 14 days.  Dispense: 42 tablet; Refill: 0    3. Moderate recurrent major depression  -     citalopram (CeleXA) 10 MG tablet; Take 1 tablet by mouth Daily for 14 days, THEN 2 tablets Daily for 14 days.  Dispense: 42 tablet; Refill: 0  -     buPROPion XL (WELLBUTRIN XL) 300 MG 24 hr tablet; Take 1 tablet by mouth Every Morning.  Dispense: 30 tablet; Refill: 2    4. Post traumatic stress disorder (PTSD)    5. Other insomnia         Mental Status Exam:   Hygiene:   good  Cooperation:  Cooperative  Eye Contact:   "Good  Psychomotor Behavior:  Appropriate  Affect:  Restricted and Tries to hold back tears  Mood: depressed and anxious  Speech:  Normal  Thought Process:  Goal directed and Linear  Thought Content:  Normal  Suicidal:  Suicidal Ideation and passive without intent or plan  Homicidal:  None  Hallucinations:  None  Delusion:  None  Memory:  Intact  Orientation:  Person, Place, Time and Situation  Reliability:  good  Insight:  Good  Judgement:  Good  Impulse Control:  Good  Physical/Medical Issues:  Yes Hidradenitis suppurativa, chronic back pain, endometriosis, and arthritis      PHQ-9 Score:   PHQ-9 Total Score:      Impression/Plan:  -This is a follow up and medication check. Alisa reports feeling stress and anxiety due to moving to [place] for graduate school next week. She feels her mood gets low and she has thoughts such as \"she'd be better off.\" She denies intent or plan for suicide. She elected to stop therapy for a time period because she was finding it to be not as helpful as before. She tries to implement her coping skills when needed. I encouraged her to have a safety plan in place in her new home and to have available resources when needed should her mood worsen. She agrees. For medication management, we discussed increasing Vyvanse to 70 mg for further benefits and adding a medication which can help mood such as Celexa. I explained the mechanism of action and purpose, as well as risks versus benefits, and adverse effects.   -Stop propranolol due to perceived lack of efficacy.  -Initiate Celexa 10 mg daily for two weeks and then 20 mg daily for depression and anxiety.  -Increase Vyvanse to 70 mg daily for ADHD.   -Continue lamotrigine 200 mg nightly for depression and anxiety.  Patient has refills.  -Continue Wellbutrin to 300 mg daily for depression.   -Continue BuSpar 15 mg twice daily for anxiety. Patient has refills.    -Continue therapy.  - Follow up visit with medication management 3 weeks.  -Last " urine drug screen 01/09/2923.  Appropriate.  -The DESIRAE report, reviewed through PDMP, of the past 12 months were reviewed and is appropriate.  The patient/guardian reports taking the medication only as prescribed.  The patient/guardian denies any abuse or misuse of the medication.  The patient/guardian denies any other substance use or issues.  There are no apparent substance related issues.  The patient reports no side effects of the current medication usage.  The patient/guardian has reported significant improvement with medication usage and wishes to continue medication as prescribed.  The patient/guardian is appropriate to continue with current medication usage at this time.  Reinforced risks and side effects of medication usage, patient and/or guardian verbalize understanding in their own words and are in agreement with current plan.      MEDS ORDERED DURING VISIT:  New Medications Ordered This Visit   Medications   • citalopram (CeleXA) 10 MG tablet     Sig: Take 1 tablet by mouth Daily for 14 days, THEN 2 tablets Daily for 14 days.     Dispense:  42 tablet     Refill:  0   • lisdexamfetamine (Vyvanse) 70 MG capsule     Sig: Take 1 capsule by mouth Every Morning     Dispense:  30 capsule     Refill:  0   • buPROPion XL (WELLBUTRIN XL) 300 MG 24 hr tablet     Sig: Take 1 tablet by mouth Every Morning.     Dispense:  30 tablet     Refill:  2     I spent 43 minutes caring for Alisa on this date of service. This time includes time spent by me in the following activities:preparing for the visit, performing a medically appropriate examination and/or evaluation , counseling and educating the patient/family/caregiver, ordering medications, tests, or procedures, documenting information in the medical record and care coordination    Follow Up   Return in about 4 weeks (around 5/31/2023) for Medication Check.  Patient was given instructions and counseling regarding her condition or for health maintenance advice.  Please see specific information pulled into the AVS if appropriate.       TREATMENT PLAN/GOALS: Continue supportive psychotherapy efforts and medications as indicated. Treatment and medication options discussed during today's visit. Patient acknowledged and verbally consented to continue with current treatment plan and was educated on the importance of compliance with treatment and follow-up appointments.    MEDICATION ISSUES:  Discussed medication options and treatment plan of prescribed medication as well as the risks, benefits, and side effects including potential falls, possible impaired driving and metabolic adversities among others. Patient is agreeable to call the office with any worsening of symptoms or onset of side effects. Patient is agreeable to call 911 or go to the nearest ER should he/she begin having SI/HI.        This document has been electronically signed by THELMA Henriquez, PMHNP-BC  May 3, 2023 20:45 EDT    Part of this note may be an electronic transcription/translation of spoken language to printed text using the Dragon Dictation System.    This encounter note was scribed for THELMA Hubbard  by Angie Mcgrath, student NP.  I, THELMA Hubbard , personally performed the services described in this documentation as scribed by the above named individual in my presence, and it is both accurate and complete.     04/12/2023  11:55 EDT

## 2023-05-03 ENCOUNTER — TELEMEDICINE (OUTPATIENT)
Dept: PSYCHIATRY | Facility: CLINIC | Age: 28
End: 2023-05-03
Payer: COMMERCIAL

## 2023-05-03 DIAGNOSIS — F43.10 POST TRAUMATIC STRESS DISORDER (PTSD): Chronic | ICD-10-CM

## 2023-05-03 DIAGNOSIS — F33.1 MODERATE RECURRENT MAJOR DEPRESSION: Chronic | ICD-10-CM

## 2023-05-03 DIAGNOSIS — G47.09 OTHER INSOMNIA: Chronic | ICD-10-CM

## 2023-05-03 DIAGNOSIS — F90.0 ADHD (ATTENTION DEFICIT HYPERACTIVITY DISORDER), INATTENTIVE TYPE: Primary | Chronic | ICD-10-CM

## 2023-05-03 DIAGNOSIS — F41.1 GAD (GENERALIZED ANXIETY DISORDER): Chronic | ICD-10-CM

## 2023-05-03 RX ORDER — CITALOPRAM 10 MG/1
TABLET ORAL
Qty: 42 TABLET | Refills: 0 | Status: SHIPPED | OUTPATIENT
Start: 2023-05-03 | End: 2023-05-31

## 2023-05-03 RX ORDER — BUPROPION HYDROCHLORIDE 300 MG/1
300 TABLET ORAL EVERY MORNING
Qty: 30 TABLET | Refills: 2 | Status: SHIPPED | OUTPATIENT
Start: 2023-05-03

## 2023-05-03 RX ORDER — ALBUTEROL SULFATE 90 UG/1
2 AEROSOL, METERED RESPIRATORY (INHALATION)
COMMUNITY
Start: 2023-05-02

## 2023-05-03 RX ORDER — PANTOPRAZOLE SODIUM 40 MG/1
40 TABLET, DELAYED RELEASE ORAL
COMMUNITY
Start: 2023-05-02

## 2023-05-14 DIAGNOSIS — F33.1 MODERATE RECURRENT MAJOR DEPRESSION: Chronic | ICD-10-CM

## 2023-05-15 RX ORDER — LAMOTRIGINE 100 MG/1
TABLET ORAL
Qty: 90 TABLET | OUTPATIENT
Start: 2023-05-15

## 2023-05-27 DIAGNOSIS — F41.1 GAD (GENERALIZED ANXIETY DISORDER): Chronic | ICD-10-CM

## 2023-05-30 RX ORDER — PROPRANOLOL HYDROCHLORIDE 10 MG/1
TABLET ORAL
Qty: 180 TABLET | Refills: 0 | OUTPATIENT
Start: 2023-05-30

## 2023-06-05 NOTE — PROGRESS NOTES
"This provider is located at [facility], Behavioral Health ,[address], [address] in [place], [place],using a secure MyChart Video Visit through [employer]. Patient is being seen remotely via telehealth at their home address in [place], and stated they are in a secure environment for this session. The patient's condition being diagnosed/treated is appropriate for telemedicine. The provider identified herself as well as her credentials.   The patient, and/or patients guardian, consent to be seen remotely, and when consent is given they understand that the consent allows for patient identifiable information to be sent to a third party as needed.   They may refuse to be seen remotely at any time. The electronic data is encrypted and password protected, and the patient and/or guardian has been advised of the potential risks to privacy not withstanding such measures.    Chief Complaint  Depression, anxiety, PTSD, and ADHD in the adult      Subjective          Alisa Saul presents via MyChart Video through [employer] by herself for a follow up and medication check.    History of Present Illness: Alisa states, \"it has been rough.\" Alisa tells me she is taking 3 classes: Physiology, Chemistry and Physics, and Health Assessment. She feels overwhelmed with work as expected and tells me this confirms her decision not to try and work while in school. She is living in [place] and notes the lack of her support systems makes life more difficult as well. She is living out of boxes due to lack of time to unpack which causes her to feel \"like she is drowning.\" She feels depression is improving and notes it feels like she can attribute it to the situation now versus an existential dread. Her anxiety is still high. She worries about everything. She is not sleeping well. She will lay for hours trying to fall asleep and wakes through the time and cannot get back to sleep. Her sleep has been poor but is much " "worse now. She had two hours last night. She feels Celexa is starting to help but she forgot the instructions to increase after two weeks so continues at 10 mg. She tells me there has been \"no scary depression\" and he is getting close to baseline. She has no appetite but forces herself to eat so she can concentrate and prevent migraines. She reports having good focus, concentration, and appetite.  She reports compliance with medications. She has tried: Mirtazapine, Lexapro, Zoloft, Effexor, BuSpar, Trintellix, and Wellbutrin. Currently, she is taking Celexa, Vyvanse, Lamotrigine, Wellbutrin XL and BuSpar.  She denies any side effects.  She denies any SI/HI/AVH.    Current Medications:   Current Outpatient Medications   Medication Sig Dispense Refill    albuterol sulfate  (90 Base) MCG/ACT inhaler Inhale 2 puffs.      buPROPion XL (WELLBUTRIN XL) 300 MG 24 hr tablet Take 1 tablet by mouth Every Morning. 30 tablet 2    busPIRone (BUSPAR) 15 MG tablet Take 1 tablet by mouth 2 (Two) Times a Day. 60 tablet 2    citalopram (CeleXA) 20 MG tablet Take 1 tablet by mouth Daily for 14 days, THEN 1.5 tablets Daily for 14 days. 35 tablet 0    doxycycline (MONODOX) 100 MG capsule       Humira Pen 40 MG/0.4ML Pen-injector Kit       levocetirizine (XYZAL) 5 MG tablet Take 1 tablet by mouth.      lisdexamfetamine (Vyvanse) 70 MG capsule Take 1 capsule by mouth Every Morning 30 capsule 0    Low-Ogestrel 0.3-30 MG-MCG per tablet TAKE 1 TABLET BY MOUTH 1 TIME EACH DAY.      montelukast (SINGULAIR) 10 MG tablet Take 1 tablet by mouth Every Night.      ondansetron ODT (ZOFRAN-ODT) 4 MG disintegrating tablet       pantoprazole (PROTONIX) 40 MG EC tablet Take 1 tablet by mouth.      hydrOXYzine pamoate (VISTARIL) 25 MG capsule Take 1 capsule by mouth 3 (Three) Times a Day As Needed for Itching. 90 capsule 2    lamoTRIgine  MG tablet sustained-release 24 hour Take 200 mg by mouth Every Night. 30 tablet 2    promethazine " (PHENERGAN) 25 MG tablet Take 1 tablet by mouth Every 6 (Six) Hours As Needed. (Patient not taking: Reported on 6/7/2023)      Promethegan 25 MG suppository  (Patient not taking: Reported on 6/7/2023)      TIZANIDINE HCL PO 4 mg. (Patient not taking: Reported on 6/7/2023)       No current facility-administered medications for this visit.         Objective   Vital Signs:   There were no vitals taken for this visit.    Physical Exam  Nursing note reviewed. Vitals reviewed: No vitals due to telehealth visit.  Constitutional:       Appearance: Normal appearance. She is well-developed. She is obese.   Neurological:      General: No focal deficit present.      Mental Status: She is alert and oriented to person, place, and time.   Psychiatric:         Attention and Perception: Attention and perception normal.         Mood and Affect: Mood is depressed (improved).         Speech: Speech normal.         Behavior: Behavior normal. Behavior is cooperative.         Thought Content: Thought content normal.         Cognition and Memory: Cognition and memory normal.         Judgment: Judgment normal.      Result Review :     The following data was reviewed by: THELMA Hubbard on 06/07/2023:         Assessment and Plan    Diagnoses and all orders for this visit:    1. Moderate recurrent major depression (Primary)  -     citalopram (CeleXA) 20 MG tablet; Take 1 tablet by mouth Daily for 14 days, THEN 1.5 tablets Daily for 14 days.  Dispense: 35 tablet; Refill: 0  -     lamoTRIgine  MG tablet sustained-release 24 hour; Take 200 mg by mouth Every Night.  Dispense: 30 tablet; Refill: 2    2. CRISTINA (generalized anxiety disorder)  -     citalopram (CeleXA) 20 MG tablet; Take 1 tablet by mouth Daily for 14 days, THEN 1.5 tablets Daily for 14 days.  Dispense: 35 tablet; Refill: 0  -     lamoTRIgine  MG tablet sustained-release 24 hour; Take 200 mg by mouth Every Night.  Dispense: 30 tablet; Refill: 2    3. ADHD  (attention deficit hyperactivity disorder), inattentive type  -     lisdexamfetamine (Vyvanse) 70 MG capsule; Take 1 capsule by mouth Every Morning  Dispense: 30 capsule; Refill: 0    4. Other insomnia  -     hydrOXYzine pamoate (VISTARIL) 25 MG capsule; Take 1 capsule by mouth 3 (Three) Times a Day As Needed for Itching.  Dispense: 90 capsule; Refill: 2         Mental Status Exam:   Hygiene:   good  Cooperation:  Cooperative  Eye Contact:  Good  Psychomotor Behavior:  Appropriate  Affect:  Appropriate  Mood: depressed and improved  Speech:  Normal  Thought Process:  Goal directed and Linear  Thought Content:  Normal  Suicidal:  None  Homicidal:  None  Hallucinations:  None  Delusion:  None  Memory:  Intact  Orientation:  Person, Place, Time and Situation  Reliability:  good  Insight:  Good  Judgement:  Good  Impulse Control:  Good  Physical/Medical Issues:  Yes Hidradenitis suppurativa, chronic back pain, endometriosis, and arthritis       PHQ-9 Score:   PHQ-9 Total Score:      Impression/Plan:  -This is a follow up and medication check. Alisa reports an improvement in depression. Anxiety is worse. She is taking three graduate classes and feels as if she is drowning at times. She is keeping her head above the water and motivated to continue on. Her lack of support in [place] is hard. She is trying to keep some good health practices like making herself eat and taking breaks if she can. She is glad to not be working so she can focus on school. She is not sleeping well now. She is motivated to make medication adjustments and notes benefits of celexa. Therefore, we will increase it to 20 mg and then 30 mg. We will continue Vyvanse because it is helping her ADHD symptoms. We will change the Lamotrigine to ER version for lasting benefits and add Hydroxyzine Pamoate for sleep. She agrees.   -Initiate Hydroxyzine Pamoate 25 mg three times daily as needed for anxiety and insomnia.   -Increase Celexa to 20 mg daily for two  weeks and then 30 mg daily for depression and anxiety.  -Continue Vyvanse 70 mg daily for ADHD.   -Change lamotrigine to  mg nightly for depression and anxiety.    -Continue Wellbutrin 300 mg daily for depression.   -Continue BuSpar 15 mg twice daily for anxiety. Patient has refills.    -Continue therapy PRN.  - Follow up visit with medication management 6 weeks.  -Last urine drug screen 01/09/2923.  Appropriate.  -The DESIRAE report, reviewed through PDMP, of the past 12 months were reviewed and is appropriate.  The patient/guardian reports taking the medication only as prescribed.  The patient/guardian denies any abuse or misuse of the medication.  The patient/guardian denies any other substance use or issues.  There are no apparent substance related issues.  The patient reports no side effects of the current medication usage.  The patient/guardian has reported significant improvement with medication usage and wishes to continue medication as prescribed.  The patient/guardian is appropriate to continue with current medication usage at this time.  Reinforced risks and side effects of medication usage, patient and/or guardian verbalize understanding in their own words and are in agreement with current plan.      MEDS ORDERED DURING VISIT:  New Medications Ordered This Visit   Medications    citalopram (CeleXA) 20 MG tablet     Sig: Take 1 tablet by mouth Daily for 14 days, THEN 1.5 tablets Daily for 14 days.     Dispense:  35 tablet     Refill:  0    lisdexamfetamine (Vyvanse) 70 MG capsule     Sig: Take 1 capsule by mouth Every Morning     Dispense:  30 capsule     Refill:  0    lamoTRIgine  MG tablet sustained-release 24 hour     Sig: Take 200 mg by mouth Every Night.     Dispense:  30 tablet     Refill:  2    hydrOXYzine pamoate (VISTARIL) 25 MG capsule     Sig: Take 1 capsule by mouth 3 (Three) Times a Day As Needed for Itching.     Dispense:  90 capsule     Refill:  2       Follow Up   Return in  about 6 weeks (around 7/19/2023) for Medication Check.  Patient was given instructions and counseling regarding her condition or for health maintenance advice. Please see specific information pulled into the AVS if appropriate.       TREATMENT PLAN/GOALS: Continue supportive psychotherapy efforts and medications as indicated. Treatment and medication options discussed during today's visit. Patient acknowledged and verbally consented to continue with current treatment plan and was educated on the importance of compliance with treatment and follow-up appointments.    MEDICATION ISSUES:  Discussed medication options and treatment plan of prescribed medication as well as the risks, benefits, and side effects including potential falls, possible impaired driving and metabolic adversities among others. Patient is agreeable to call the office with any worsening of symptoms or onset of side effects. Patient is agreeable to call 911 or go to the nearest ER should he/she begin having SI/HI.        This document has been electronically signed by THELMA Henriquez, PMHNP-BC  June 7, 2023 16:46 EDT    Part of this note may be an electronic transcription/translation of spoken language to printed text using the Dragon Dictation System.    This encounter note was scribed for THELMA Hubabrd  by Angie Mcgrath, student NP.  I, THELMA Hubbard , personally performed the services described in this documentation as scribed by the above named individual in my presence, and it is both accurate and complete.     04/12/2023  11:55 EDT

## 2023-06-07 ENCOUNTER — TELEMEDICINE (OUTPATIENT)
Dept: PSYCHIATRY | Facility: CLINIC | Age: 28
End: 2023-06-07

## 2023-06-07 DIAGNOSIS — F41.1 GAD (GENERALIZED ANXIETY DISORDER): Chronic | ICD-10-CM

## 2023-06-07 DIAGNOSIS — G47.09 OTHER INSOMNIA: Chronic | ICD-10-CM

## 2023-06-07 DIAGNOSIS — F90.0 ADHD (ATTENTION DEFICIT HYPERACTIVITY DISORDER), INATTENTIVE TYPE: Chronic | ICD-10-CM

## 2023-06-07 DIAGNOSIS — F33.1 MODERATE RECURRENT MAJOR DEPRESSION: Primary | Chronic | ICD-10-CM

## 2023-06-07 PROCEDURE — 99214 OFFICE O/P EST MOD 30 MIN: CPT | Performed by: NURSE PRACTITIONER

## 2023-06-07 RX ORDER — LAMOTRIGINE 200 MG/1
200 TABLET, EXTENDED RELEASE ORAL NIGHTLY
Qty: 30 TABLET | Refills: 2 | Status: SHIPPED | OUTPATIENT
Start: 2023-06-07

## 2023-06-07 RX ORDER — HYDROXYZINE PAMOATE 25 MG/1
25 CAPSULE ORAL 3 TIMES DAILY PRN
Qty: 90 CAPSULE | Refills: 2 | Status: SHIPPED | OUTPATIENT
Start: 2023-06-07

## 2023-06-07 RX ORDER — CITALOPRAM 20 MG/1
TABLET ORAL
Qty: 35 TABLET | Refills: 0 | Status: SHIPPED | OUTPATIENT
Start: 2023-06-07 | End: 2023-07-05

## 2023-06-09 DIAGNOSIS — G47.09 OTHER INSOMNIA: Chronic | ICD-10-CM

## 2023-06-09 RX ORDER — HYDROXYZINE PAMOATE 25 MG/1
25 CAPSULE ORAL 3 TIMES DAILY PRN
Qty: 270 CAPSULE | OUTPATIENT
Start: 2023-06-09

## 2023-07-22 NOTE — PROGRESS NOTES
"This provider is located at [facility] ,[address], [address] in [place], [place],using a secure MyChart Video Visit through [employer]. Patient is being seen remotely via telehealth at their home address in [place], and stated they are in a secure environment for this session. The patient's condition being diagnosed/treated is appropriate for telemedicine. The provider identified herself as well as her credentials.   The patient, and/or patients guardian, consent to be seen remotely, and when consent is given they understand that the consent allows for patient identifiable information to be sent to a third party as needed.   They may refuse to be seen remotely at any time. The electronic data is encrypted and password protected, and the patient and/or guardian has been advised of the potential risks to privacy not withstanding such measures.    Chief Complaint  Depression, anxiety, PTSD, and ADHD in the adult      Subjective          Alisa Saul presents via MyChart Video through [employer] by herself for a follow up and medication check.    History of Present Illness: Alisa states, \" have not been great.\"  Alisa tells me that she was without Vyvanse for about a month.  She tells me she had a notable difference in her concentration, focus, and attention, as well as motivation.  Her depression worsened and she states, \"I was not in a great place.\"  She has resumed her Vyvanse and notes that depression has improved gradually over the last few days.  During the depressive episode, her sleep was affected.  She uses dual action melatonin most nights which does help and Vistaril on nights when she can have longer periods of sleep.  She also reports benefits of taking a hot shower prior to bedtime.  She tells me that anxiety worsened without medication.  She reports having occasional, fleeting passive suicidal ideations of \"being better off if she was not here.\"  However, " she adamantly denies any intent or plan for suicide.  She denies any medical changes since last visit or problems with appetite. She reports compliance with medications. She has tried: Mirtazapine, Lexapro, Zoloft, Effexor, BuSpar, Trintellix, and Wellbutrin. Currently, she is taking Celexa, Vyvanse, Lamotrigine, Wellbutrin XL and BuSpar.  She denies any side effects.  She denies any SI/HI/AVH.    Current Medications:   Current Outpatient Medications   Medication Sig Dispense Refill    albuterol sulfate  (90 Base) MCG/ACT inhaler Inhale 2 puffs.      buPROPion XL (WELLBUTRIN XL) 300 MG 24 hr tablet Take 1 tablet by mouth Every Morning. 30 tablet 2    busPIRone (BUSPAR) 15 MG tablet Take 1 tablet by mouth 2 (Two) Times a Day. 60 tablet 2    citalopram (CeleXA) 40 MG tablet Take 1 tablet by mouth Daily. 30 tablet 2    Cyanocobalamin (Vitamin B 12) 500 MCG tablet Take 1 tablet by mouth Daily.      Humira Pen 40 MG/0.4ML Pen-injector Kit       hydrOXYzine pamoate (VISTARIL) 25 MG capsule Take 1 capsule by mouth 3 (Three) Times a Day As Needed for Itching. 90 capsule 2    lamoTRIgine  MG tablet sustained-release 24 hour Take 200 mg by mouth Every Night. 30 tablet 2    lansoprazole (PREVACID) 15 MG capsule Take 1 capsule by mouth Daily.      levocetirizine (XYZAL) 5 MG tablet Take 1 tablet by mouth.      lisdexamfetamine (Vyvanse) 70 MG capsule Take 1 capsule by mouth Every Morning 30 capsule 0    Low-Ogestrel 0.3-30 MG-MCG per tablet TAKE 1 TABLET BY MOUTH 1 TIME EACH DAY.      Magnesium Oxide -Mg Supplement 500 MG tablet Take 1 tablet by mouth Daily.      melatonin 5 MG sublingual tablet sublingual tablet Place 1 tablet under the tongue Every Night. Dual action      montelukast (SINGULAIR) 10 MG tablet Take 1 tablet by mouth Every Night.      ondansetron ODT (ZOFRAN-ODT) 4 MG disintegrating tablet       pantoprazole (PROTONIX) 40 MG EC tablet Take 1 tablet by mouth.      promethazine (PHENERGAN) 25 MG  tablet Take 1 tablet by mouth Every 6 (Six) Hours As Needed.      Promethegan 25 MG suppository       TIZANIDINE HCL PO 4 mg.      doxycycline (MONODOX) 100 MG capsule  (Patient not taking: Reported on 7/25/2023)       No current facility-administered medications for this visit.         Objective   Vital Signs:   There were no vitals taken for this visit.    Physical Exam  Nursing note reviewed. Vitals reviewed: No vitals due to telehealth visit.  Constitutional:       Appearance: Normal appearance. She is well-developed. She is obese.   Neurological:      General: No focal deficit present.      Mental Status: She is alert and oriented to person, place, and time.   Psychiatric:         Attention and Perception: Attention and perception normal.         Mood and Affect: Mood is depressed.         Speech: Speech normal.         Behavior: Behavior normal. Behavior is cooperative.         Thought Content: Thought content includes suicidal (passive suicidal ideations without intent or plan) ideation.         Cognition and Memory: Cognition and memory normal.         Judgment: Judgment normal.      Result Review :     The following data was reviewed by: THELMA Hubbard on 07/25/2023:         Assessment and Plan    Diagnoses and all orders for this visit:    1. Moderate recurrent major depression (Primary)  -     citalopram (CeleXA) 40 MG tablet; Take 1 tablet by mouth Daily.  Dispense: 30 tablet; Refill: 2  -     buPROPion XL (WELLBUTRIN XL) 300 MG 24 hr tablet; Take 1 tablet by mouth Every Morning.  Dispense: 30 tablet; Refill: 2  -     lamoTRIgine  MG tablet sustained-release 24 hour; Take 200 mg by mouth Every Night.  Dispense: 30 tablet; Refill: 2    2. CRISTINA (generalized anxiety disorder)  -     citalopram (CeleXA) 40 MG tablet; Take 1 tablet by mouth Daily.  Dispense: 30 tablet; Refill: 2  -     lamoTRIgine  MG tablet sustained-release 24 hour; Take 200 mg by mouth Every Night.  Dispense: 30  tablet; Refill: 2    3. ADHD (attention deficit hyperactivity disorder), inattentive type    4. Other insomnia    5. Post traumatic stress disorder (PTSD)         Mental Status Exam:   Hygiene:   good  Cooperation:  Cooperative  Eye Contact:  Good  Psychomotor Behavior:  Appropriate  Affect:  Appropriate  Mood: depressed  Speech:  Normal  Thought Process:  Goal directed and Linear  Thought Content:  Normal  Suicidal:  Suicidal Ideation and passive without intent or plan  Homicidal:  None  Hallucinations:  None  Delusion:  None  Memory:  Intact  Orientation:  Person, Place, Time and Situation  Reliability:  good  Insight:  Good  Judgement:  Good  Impulse Control:  Good  Physical/Medical Issues:  Yes Hidradenitis suppurativa, chronic back pain, endometriosis, and arthritis       PHQ-9 Score:   PHQ-9 Total Score:      Impression/Plan:  -This is a follow up and medication check. Alisa reports worsening depression and anxiety.  She feels being without her Vyvanse not only affected executive functioning but also her mood and anxiety levels.  She was not sleeping well.  She continues in school which is causing much stress for her.  She has been trying to maintain some good health practices like taking several 10-minute walks a day with her dog.  She has been able to meet some neighbors.  She is also trying to establish a good sleep hygiene routine like taking a hot shower prior to bed.  She finds all of these things beneficial but reports having difficulty maintaining when she is in school.  We discussed about progress not perfection and trying to incorporate the most beneficial lifestyle adjustments.  She agrees.  We discussed possible medication options and she would like to try the lamotrigine extended release since she did not pick this up from pharmacy at last refill.  She would also like to try an increase in Celexa for depression and anxiety.  Otherwise, she is pleased with her current medication regimen and would  like to continue at current doses.  -Increase Celexa to 40 mg daily for depression and anxiety.  -Change lamotrigine to  mg nightly for depression and anxiety.    -Continue Vyvanse 70 mg daily for ADHD.  Patient has refills.  -Continue Wellbutrin 300 mg daily for depression.   -Continue BuSpar 15 mg twice daily for anxiety. Patient has refills.    -Continue Vistaril 25 mg 3 times daily as needed for anxiety and sleep.  Patient has refills.  -Continue therapy PRN.  -Last urine drug screen 01/09/2923.  Appropriate.  -The DESIRAE report, reviewed through PDMP, of the past 12 months were reviewed and is appropriate.  The patient/guardian reports taking the medication only as prescribed.  The patient/guardian denies any abuse or misuse of the medication.  The patient/guardian denies any other substance use or issues.  There are no apparent substance related issues.  The patient reports no side effects of the current medication usage.  The patient/guardian has reported significant improvement with medication usage and wishes to continue medication as prescribed.  The patient/guardian is appropriate to continue with current medication usage at this time.  Reinforced risks and side effects of medication usage, patient and/or guardian verbalize understanding in their own words and are in agreement with current plan.      MEDS ORDERED DURING VISIT:  New Medications Ordered This Visit   Medications    citalopram (CeleXA) 40 MG tablet     Sig: Take 1 tablet by mouth Daily.     Dispense:  30 tablet     Refill:  2    buPROPion XL (WELLBUTRIN XL) 300 MG 24 hr tablet     Sig: Take 1 tablet by mouth Every Morning.     Dispense:  30 tablet     Refill:  2    lamoTRIgine  MG tablet sustained-release 24 hour     Sig: Take 200 mg by mouth Every Night.     Dispense:  30 tablet     Refill:  2       Follow Up   Return in about 2 months (around 9/25/2023) for Medication Check.  Patient was given instructions and counseling  regarding her condition or for health maintenance advice. Please see specific information pulled into the AVS if appropriate.       TREATMENT PLAN/GOALS: Continue supportive psychotherapy efforts and medications as indicated. Treatment and medication options discussed during today's visit. Patient acknowledged and verbally consented to continue with current treatment plan and was educated on the importance of compliance with treatment and follow-up appointments.    MEDICATION ISSUES:  Discussed medication options and treatment plan of prescribed medication as well as the risks, benefits, and side effects including potential falls, possible impaired driving and metabolic adversities among others. Patient is agreeable to call the office with any worsening of symptoms or onset of side effects. Patient is agreeable to call 911 or go to the nearest ER should he/she begin having SI/HI.        This document has been electronically signed by THELMA Henriquez, PMHNP-BC  July 25, 2023 15:25 EDT    Part of this note may be an electronic transcription/translation of spoken language to printed text using the Dragon Dictation System.    This encounter note was scribed for THELMA Hubbard  by Angie Mcgrath, student NP.  I, THELMA Hubbard , personally performed the services described in this documentation as scribed by the above named individual in my presence, and it is both accurate and complete.     04/12/2023  11:55 EDT

## 2023-07-25 ENCOUNTER — TELEMEDICINE (OUTPATIENT)
Dept: PSYCHIATRY | Facility: CLINIC | Age: 28
End: 2023-07-25

## 2023-07-25 DIAGNOSIS — F33.1 MODERATE RECURRENT MAJOR DEPRESSION: Primary | Chronic | ICD-10-CM

## 2023-07-25 DIAGNOSIS — F33.1 MODERATE RECURRENT MAJOR DEPRESSION: Chronic | ICD-10-CM

## 2023-07-25 DIAGNOSIS — G47.09 OTHER INSOMNIA: Chronic | ICD-10-CM

## 2023-07-25 DIAGNOSIS — F41.1 GAD (GENERALIZED ANXIETY DISORDER): Chronic | ICD-10-CM

## 2023-07-25 DIAGNOSIS — F43.10 POST TRAUMATIC STRESS DISORDER (PTSD): Chronic | ICD-10-CM

## 2023-07-25 DIAGNOSIS — F90.0 ADHD (ATTENTION DEFICIT HYPERACTIVITY DISORDER), INATTENTIVE TYPE: ICD-10-CM

## 2023-07-25 PROCEDURE — 99214 OFFICE O/P EST MOD 30 MIN: CPT | Performed by: NURSE PRACTITIONER

## 2023-07-25 RX ORDER — BUPROPION HYDROCHLORIDE 300 MG/1
300 TABLET ORAL EVERY MORNING
Qty: 30 TABLET | Refills: 2 | Status: SHIPPED | OUTPATIENT
Start: 2023-07-25

## 2023-07-25 RX ORDER — LAMOTRIGINE 200 MG/1
200 TABLET, EXTENDED RELEASE ORAL NIGHTLY
Qty: 30 TABLET | Refills: 2 | Status: CANCELLED | OUTPATIENT
Start: 2023-07-25

## 2023-07-25 RX ORDER — LAMOTRIGINE 200 MG/1
200 TABLET, EXTENDED RELEASE ORAL NIGHTLY
Qty: 30 TABLET | Refills: 2 | Status: SHIPPED | OUTPATIENT
Start: 2023-07-25 | End: 2023-07-25

## 2023-07-25 RX ORDER — MECOBALAMIN 5000 MCG
15 TABLET,DISINTEGRATING ORAL DAILY
COMMUNITY

## 2023-07-25 RX ORDER — BACLOFEN 20 MG
500 TABLET ORAL DAILY
COMMUNITY

## 2023-07-25 RX ORDER — CYANOCOBALAMIN (VITAMIN B-12) 500 MCG
500 TABLET ORAL DAILY
COMMUNITY

## 2023-07-25 RX ORDER — MECLIZINE HYDROCHLORIDE 25 MG/1
TABLET ORAL
COMMUNITY
Start: 2023-06-29 | End: 2023-07-25

## 2023-07-25 RX ORDER — LAMOTRIGINE 100 MG/1
100 TABLET ORAL 2 TIMES DAILY
Qty: 60 TABLET | Refills: 2 | Status: SHIPPED | OUTPATIENT
Start: 2023-07-25

## 2023-07-25 RX ORDER — CITALOPRAM 40 MG/1
40 TABLET ORAL DAILY
Qty: 30 TABLET | Refills: 2 | Status: SHIPPED | OUTPATIENT
Start: 2023-07-25

## 2023-07-25 NOTE — TELEPHONE ENCOUNTER
Rx Refill Note  Requested Prescriptions     Pending Prescriptions Disp Refills    lamoTRIgine  MG tablet sustained-release 24 hour 30 tablet 2     Sig: Take 200 mg by mouth Every Night.      Last office visit with prescribing clinician: 1/9/2023      Next office visit with prescribing clinician: Visit date not found            Coby Machuca MA  07/25/23, 16:21 EDT

## 2023-07-25 NOTE — TELEPHONE ENCOUNTER
Rx Refill Note  Requested Prescriptions     Pending Prescriptions Disp Refills    lamoTRIgine  MG tablet sustained-release 24 hour 30 tablet 2     Sig: Take 200 mg by mouth Every Night.      Last office visit with prescribing clinician: 1/9/2023      Next office visit with prescribing clinician: Visit date not found            Coby Machuca MA  07/25/23, 16:33 EDT

## 2023-08-16 DIAGNOSIS — F90.0 ADHD (ATTENTION DEFICIT HYPERACTIVITY DISORDER), INATTENTIVE TYPE: Chronic | ICD-10-CM

## 2023-08-17 DIAGNOSIS — F90.0 ADHD (ATTENTION DEFICIT HYPERACTIVITY DISORDER), INATTENTIVE TYPE: Chronic | ICD-10-CM

## 2023-08-17 NOTE — TELEPHONE ENCOUNTER
Called [facility] [phone] they were on lunch had to leave a message to cancel Vyvanse left all information. Called Patient and advised her refill had been sent.

## 2023-08-17 NOTE — TELEPHONE ENCOUNTER
Patient found a different [facility] that has Vyvanse in stock its at [place] they only have a 24 day supply. If you agree I can cancel at the [address] [facility].     Rx Refill Note  Requested Prescriptions     Pending Prescriptions Disp Refills    lisdexamfetamine (Vyvanse) 70 MG capsule 24 capsule 0     Sig: Take 1 capsule by mouth Every Morning      Last office visit with prescribing clinician: 1/9/2023   Last telemedicine visit with prescribing clinician: 7/25/2023   Next office visit with prescribing clinician: 9/26/2023                         Would you like a call back once the refill request has been completed: [] Yes [] No    If the office needs to give you a call back, can they leave a voicemail: [] Yes [] No    Lindsay Downing CMA  08/17/23, 13:34 EDT

## 2023-09-03 DIAGNOSIS — G47.09 OTHER INSOMNIA: Chronic | ICD-10-CM

## 2023-09-05 RX ORDER — HYDROXYZINE PAMOATE 25 MG/1
25 CAPSULE ORAL 3 TIMES DAILY PRN
Qty: 270 CAPSULE | Refills: 1 | Status: SHIPPED | OUTPATIENT
Start: 2023-09-05

## 2023-09-13 DIAGNOSIS — F90.0 ADHD (ATTENTION DEFICIT HYPERACTIVITY DISORDER), INATTENTIVE TYPE: Chronic | ICD-10-CM

## 2023-09-13 NOTE — TELEPHONE ENCOUNTER
"\"Patient comment: I need a 6 day only refill sent in where I only got to refill 24 of them last time (due to availability). When I got that prescription, the pharmacist said I would have to have an additional 6 day refill when the 24 ran out.\""
Called and informed patient of above. Patient voiced her understanding and stated generic would be fine. 
I will send and will you let patient know that Vyvanse went Generic so should be more available and cheaper?
Thank you
less than or equal to 2 seconds

## 2023-09-18 DIAGNOSIS — F90.0 ADHD (ATTENTION DEFICIT HYPERACTIVITY DISORDER), INATTENTIVE TYPE: Chronic | ICD-10-CM

## 2023-09-19 NOTE — TELEPHONE ENCOUNTER
Patient called in said refill was refused and she needs a refill last fill was 6 days.  Rx Refill Note  Requested Prescriptions     Pending Prescriptions Disp Refills    lisdexamfetamine (Vyvanse) 70 MG capsule 30 capsule 0     Sig: Take 1 capsule by mouth Every Morning     Refused Prescriptions Disp Refills    lisdexamfetamine (Vyvanse) 70 MG capsule 6 capsule 0     Sig: Take 1 capsule by mouth Every Morning     Refused By: CHRISTIANO SHAY     Reason for Refusal: Request already responded to by other means (e.g. phone or fax)      Last office visit with prescribing clinician: 1/9/2023   Last telemedicine visit with prescribing clinician: 7/25/2023   Next office visit with prescribing clinician: 9/26/2023                         Would you like a call back once the refill request has been completed: [] Yes [] No    If the office needs to give you a call back, can they leave a voicemail: [] Yes [] No    Lindsay Downing CMA  09/19/23, 14:28 EDT

## 2023-09-24 NOTE — PROGRESS NOTES
"This provider is located at [facility] ,[address], [address] in [place], [place],using a secure MyChart Video Visit through [employer]. Patient is being seen remotely via telehealth at their home address in [place], and stated they are in a secure environment for this session. The patient's condition being diagnosed/treated is appropriate for telemedicine. The provider identified herself as well as her credentials.   The patient, and/or patients guardian, consent to be seen remotely, and when consent is given they understand that the consent allows for patient identifiable information to be sent to a third party as needed.   They may refuse to be seen remotely at any time. The electronic data is encrypted and password protected, and the patient and/or guardian has been advised of the potential risks to privacy not withstanding such measures.    Chief Complaint  Depression, anxiety, PTSD, and ADHD in the adult      Subjective          Alisa Saul presents via MyChart Video through [employer] by herself for a follow up and medication check.    History of Present Illness: Alisa states, \"I am not the greatest.\" Alisa tells me she has the stress of school but has also had additional stressors of her family's health. Her sister had a near suicide attempt and her mother had a melanoma excision on her ear. She tells me she has been constantly worried about them both but she has also had some additional feelings of anger and upset towards her sister. She is unsure where the emotions are coming from. She was able to process her feelings about her mother while talking with a friend. She feels on constant alert and a sense of dread. Her sleep is poor but she feels constantly exhausted. She even is napping which is unusual. She reports depression with impaired concentration. She failed two tests which has upset her and worsened feelings of worthlessness and failure.  She reports " compliance with medications. She has tried: Trazodone, Doxepin, Mirtazapine, Lexapro, Zoloft, Effexor, BuSpar, Trintellix, and Wellbutrin. Currently, she is taking Celexa, Vyvanse, Lamotrigine ER, Wellbutrin XL, Hydroxyzine, and BuSpar.  She denies any side effects.  She denies any SI/HI/AVH.    Current Medications:   Current Outpatient Medications   Medication Sig Dispense Refill    albuterol sulfate  (90 Base) MCG/ACT inhaler Inhale 2 puffs.      buPROPion XL (WELLBUTRIN XL) 300 MG 24 hr tablet Take 1 tablet by mouth Every Morning. 30 tablet 2    busPIRone (BUSPAR) 15 MG tablet Take 1 tablet by mouth 2 (Two) Times a Day. 60 tablet 2    citalopram (CeleXA) 40 MG tablet Take 1 tablet by mouth Daily. 30 tablet 2    Humira Pen 40 MG/0.4ML Pen-injector Kit       hydrOXYzine pamoate (VISTARIL) 25 MG capsule TAKE 1 CAPSULE BY MOUTH 3 (THREE) TIMES A DAY AS NEEDED FOR ITCHING. 270 capsule 1    lamoTRIgine (LaMICtal) 100 MG tablet Take 1 tablet by mouth 2 (Two) Times a Day. 60 tablet 2    lansoprazole (PREVACID) 15 MG capsule Take 1 capsule by mouth Daily.      levocetirizine (XYZAL) 5 MG tablet Take 1 tablet by mouth.      lisdexamfetamine (Vyvanse) 70 MG capsule Take 1 capsule by mouth Every Morning 30 capsule 0    Low-Ogestrel 0.3-30 MG-MCG per tablet TAKE 1 TABLET BY MOUTH 1 TIME EACH DAY.      Magnesium Oxide -Mg Supplement 500 MG tablet Take 1 tablet by mouth Daily.      melatonin 5 MG sublingual tablet sublingual tablet Place 1 tablet under the tongue Every Night. Dual action      montelukast (SINGULAIR) 10 MG tablet Take 1 tablet by mouth Every Night.      ondansetron ODT (ZOFRAN-ODT) 4 MG disintegrating tablet       pantoprazole (PROTONIX) 40 MG EC tablet Take 1 tablet by mouth.      promethazine (PHENERGAN) 25 MG tablet Take 1 tablet by mouth Every 6 (Six) Hours As Needed.      Promethegan 25 MG suppository       TIZANIDINE HCL PO 4 mg.      doxepin (SINEquan) 10 MG/ML solution Take 0.3-0.6 mL by mouth  Every Night. 118 mL 2    hydrOXYzine (ATARAX) 25 MG tablet Take 1 tablet by mouth 3 (Three) Times a Day As Needed for Anxiety. 90 tablet 2     No current facility-administered medications for this visit.         Objective   Vital Signs:   There were no vitals taken for this visit.    Physical Exam  Nursing note reviewed. Vitals reviewed: No vitals due to telehealth visit.  Constitutional:       Appearance: Normal appearance. She is well-developed. She is obese.   Neurological:      General: No focal deficit present.      Mental Status: She is alert and oriented to person, place, and time.   Psychiatric:         Attention and Perception: Attention and perception normal.         Mood and Affect: Mood is depressed. Affect is tearful.         Speech: Speech normal.         Behavior: Behavior normal. Behavior is cooperative.         Thought Content: Thought content includes suicidal (passive suicidal ideations without intent or plan) ideation.         Cognition and Memory: Cognition and memory normal.         Judgment: Judgment normal.      Result Review :     The following data was reviewed by: THELMA Hubbard on 09/26/2023:         Assessment and Plan    Diagnoses and all orders for this visit:    1. Moderate recurrent major depression (Primary)    2. CRISTINA (generalized anxiety disorder)  -     hydrOXYzine (ATARAX) 25 MG tablet; Take 1 tablet by mouth 3 (Three) Times a Day As Needed for Anxiety.  Dispense: 90 tablet; Refill: 2    3. Other insomnia  -     doxepin (SINEquan) 10 MG/ML solution; Take 0.3-0.6 mL by mouth Every Night.  Dispense: 118 mL; Refill: 2    4. Post traumatic stress disorder (PTSD)    5. ADHD (attention deficit hyperactivity disorder), inattentive type         Mental Status Exam:   Hygiene:   good  Cooperation:  Cooperative  Eye Contact:  Good  Psychomotor Behavior:  Appropriate  Affect:   tearful  Mood: depressed  Speech:  Normal  Thought Process:  Goal directed and Linear  Thought  Content:  Normal  Suicidal:  Suicidal Ideation and passive without intent or plan  Homicidal:  None  Hallucinations:  None  Delusion:  None  Memory:  Intact  Orientation:  Person, Place, Time and Situation  Reliability:  good  Insight:  Good  Judgement:  Good  Impulse Control:  Good  Physical/Medical Issues:  Yes Hidradenitis suppurativa, chronic back pain, endometriosis, and arthritis       PHQ-9 Score:   PHQ-9 Total Score:      Impression/Plan:  -This is a follow up and medication check. Alisa reports ongoing symptoms of depression and anxiety. Stressors include school and her family's health. She failed two tests which has exacerbating depression. She and I discussed her thoughts and feelings about her depression, anxiety, and sister's near suicide. She has processed mom's cancer diagnosis with a friend. I encouraged her to visit the counseling center at school and having some sessions. She agrees. We reviewed medications and agreed to resume Doxepin in liquid form for sleep, increase Celexa, and use Hydroxyzine tablets instead of capsule.   -Initiate Doxepin 3-6 mg nightly for insomnia.   -Increase Celexa to 60 mg daily for depression and anxiety. Patient has refills.   -Change Vistaril to Atarax 25 mg 3 times daily as needed for anxiety and sleep.    -Continue lamotrigine  mg nightly for depression and anxiety.    -Continue Vyvanse 70 mg daily for ADHD.  Patient has refills.  -Continue Wellbutrin 300 mg daily for depression. Patient has refills.  -Continue BuSpar 15 mg twice daily for anxiety. Patient has refills.    -Continue therapy PRN.  -Last urine drug screen 01/09/2923.  Appropriate.  -The DESIRAE report, reviewed through PDMP, of the past 12 months were reviewed and is appropriate.  The patient/guardian reports taking the medication only as prescribed.  The patient/guardian denies any abuse or misuse of the medication.  The patient/guardian denies any other substance use or issues.  There are no  apparent substance related issues.  The patient reports no side effects of the current medication usage.  The patient/guardian has reported significant improvement with medication usage and wishes to continue medication as prescribed.  The patient/guardian is appropriate to continue with current medication usage at this time.  Reinforced risks and side effects of medication usage, patient and/or guardian verbalize understanding in their own words and are in agreement with current plan.      MEDS ORDERED DURING VISIT:  New Medications Ordered This Visit   Medications    doxepin (SINEquan) 10 MG/ML solution     Sig: Take 0.3-0.6 mL by mouth Every Night.     Dispense:  118 mL     Refill:  2    hydrOXYzine (ATARAX) 25 MG tablet     Sig: Take 1 tablet by mouth 3 (Three) Times a Day As Needed for Anxiety.     Dispense:  90 tablet     Refill:  2   I spent 45 minutes caring for Alisa on this date of service. This time includes time spent by me in the following activities:preparing for the visit, performing a medically appropriate examination and/or evaluation , counseling and educating the patient/family/caregiver, ordering medications, tests, or procedures, documenting information in the medical record, care coordination, and supportive talk therapy      Follow Up   Return in about 2 months (around 11/26/2023) for Medication Check.  Patient was given instructions and counseling regarding her condition or for health maintenance advice. Please see specific information pulled into the AVS if appropriate.       TREATMENT PLAN/GOALS: Continue supportive psychotherapy efforts and medications as indicated. Treatment and medication options discussed during today's visit. Patient acknowledged and verbally consented to continue with current treatment plan and was educated on the importance of compliance with treatment and follow-up appointments.    MEDICATION ISSUES:  Discussed medication options and treatment plan of prescribed  medication as well as the risks, benefits, and side effects including potential falls, possible impaired driving and metabolic adversities among others. Patient is agreeable to call the office with any worsening of symptoms or onset of side effects. Patient is agreeable to call 911 or go to the nearest ER should he/she begin having SI/HI.        This document has been electronically signed by THELMA Henriquez, PMHNP-BC  September 26, 2023 17:16 EDT    Part of this note may be an electronic transcription/translation of spoken language to printed text using the Dragon Dictation System.    This encounter note was scribed for THELMA Hubbard  by Angie Mcgrath, student NP.  I, THELMA Hubbard , personally performed the services described in this documentation as scribed by the above named individual in my presence, and it is both accurate and complete.     04/12/2023  11:55 EDT

## 2023-09-26 ENCOUNTER — TELEMEDICINE (OUTPATIENT)
Dept: PSYCHIATRY | Facility: CLINIC | Age: 28
End: 2023-09-26

## 2023-09-26 DIAGNOSIS — F33.1 MODERATE RECURRENT MAJOR DEPRESSION: Primary | Chronic | ICD-10-CM

## 2023-09-26 DIAGNOSIS — F41.1 GAD (GENERALIZED ANXIETY DISORDER): Chronic | ICD-10-CM

## 2023-09-26 DIAGNOSIS — G47.09 OTHER INSOMNIA: Chronic | ICD-10-CM

## 2023-09-26 DIAGNOSIS — F43.10 POST TRAUMATIC STRESS DISORDER (PTSD): Chronic | ICD-10-CM

## 2023-09-26 DIAGNOSIS — F90.0 ADHD (ATTENTION DEFICIT HYPERACTIVITY DISORDER), INATTENTIVE TYPE: Chronic | ICD-10-CM

## 2023-09-26 RX ORDER — HYDROXYZINE HYDROCHLORIDE 25 MG/1
25 TABLET, FILM COATED ORAL 3 TIMES DAILY PRN
Qty: 90 TABLET | Refills: 2 | Status: SHIPPED | OUTPATIENT
Start: 2023-09-26

## 2023-09-26 RX ORDER — DOXEPIN HYDROCHLORIDE 10 MG/ML
3-6 SOLUTION ORAL NIGHTLY
Qty: 118 ML | Refills: 2 | Status: SHIPPED | OUTPATIENT
Start: 2023-09-26

## 2023-10-16 ENCOUNTER — TELEPHONE (OUTPATIENT)
Dept: PSYCHIATRY | Facility: CLINIC | Age: 28
End: 2023-10-16

## 2023-10-16 NOTE — TELEPHONE ENCOUNTER
Alisa said she is getting extra time for tests at school. She said she is needing a letter from you to give to her school and that she would send me an email of the guidelines for the letter. If you are able to do this, she would like for us to call her to let her know when it's ready and email it to the email on file. Thank you.

## 2023-10-19 DIAGNOSIS — F41.1 GAD (GENERALIZED ANXIETY DISORDER): Chronic | ICD-10-CM

## 2023-10-19 DIAGNOSIS — F33.1 MODERATE RECURRENT MAJOR DEPRESSION: Chronic | ICD-10-CM

## 2023-10-19 DIAGNOSIS — F90.0 ADHD (ATTENTION DEFICIT HYPERACTIVITY DISORDER), INATTENTIVE TYPE: Chronic | ICD-10-CM

## 2023-10-19 RX ORDER — LAMOTRIGINE 200 MG/1
200 TABLET ORAL NIGHTLY
Qty: 30 TABLET | Refills: 2 | Status: SHIPPED | OUTPATIENT
Start: 2023-10-19

## 2023-10-19 RX ORDER — BUSPIRONE HYDROCHLORIDE 15 MG/1
15 TABLET ORAL 2 TIMES DAILY
Qty: 60 TABLET | Refills: 2 | Status: SHIPPED | OUTPATIENT
Start: 2023-10-19

## 2023-10-19 RX ORDER — CITALOPRAM 40 MG/1
60 TABLET ORAL DAILY
Qty: 45 TABLET | Refills: 2 | Status: SHIPPED | OUTPATIENT
Start: 2023-10-19

## 2023-10-19 RX ORDER — LISDEXAMFETAMINE DIMESYLATE CAPSULES 70 MG/1
70 CAPSULE ORAL EVERY MORNING
Qty: 30 CAPSULE | Refills: 0 | Status: SHIPPED | OUTPATIENT
Start: 2023-10-19

## 2023-10-19 RX ORDER — BUPROPION HYDROCHLORIDE 300 MG/1
300 TABLET ORAL EVERY MORNING
Qty: 30 TABLET | Refills: 2 | Status: SHIPPED | OUTPATIENT
Start: 2023-10-19

## 2023-10-19 NOTE — TELEPHONE ENCOUNTER
Rx Refill Note  Requested Prescriptions     Pending Prescriptions Disp Refills    busPIRone (BUSPAR) 15 MG tablet [Pharmacy Med Name: BUSPIRONE 15MG TABLETS] 60 tablet 2     Sig: TAKE 1 TABLET BY MOUTH TWICE DAILY     Refused Prescriptions Disp Refills    citalopram (CeleXA) 40 MG tablet [Pharmacy Med Name: CITALOPRAM 40MG TABLETS] 30 tablet 2     Sig: Take 1 tablet by mouth Daily.      Last office visit with prescribing clinician: 1/9/2023   Last telemedicine visit with prescribing clinician: 9/26/2023   Next office visit with prescribing clinician: 10/19/2023                         Would you like a call back once the refill request has been completed: [] Yes [] No    If the office needs to give you a call back, can they leave a voicemail: [] Yes [] No    Lindsay Downing CMA  10/19/23, 10:50 EDT

## 2023-10-23 ENCOUNTER — TELEPHONE (OUTPATIENT)
Dept: PSYCHIATRY | Facility: CLINIC | Age: 28
End: 2023-10-23

## 2023-10-23 NOTE — TELEPHONE ENCOUNTER
----- Message from THELMA Hubbard sent at 10/23/2023  2:00 PM EDT -----  Regarding: Alisa Null's Celexa was denied because I am using a dose above the recommendations. There is some evidence that higher doses than 40 mg can improve or resolve symptoms but there are also risks that may be concerning which is why she was likely denied. Can you ask her how she is doing with the 60 mg if she is taking that dose and let her know of the decline. According to the denial, the patient or a person she designates may complete an appeal. So, would she like to do this or for me to act on her behalf? Thanks.    Anisa

## 2023-10-23 NOTE — TELEPHONE ENCOUNTER
Spoke to Patient she said she is doing  fine on the Celexa 60 mg and would like for you to do appeal if you don't mind. I advised Patient that I would ask you too and we would call once we got a determination.

## 2023-10-31 ENCOUNTER — TELEPHONE (OUTPATIENT)
Dept: PSYCHIATRY | Facility: CLINIC | Age: 28
End: 2023-10-31

## 2023-10-31 NOTE — TELEPHONE ENCOUNTER
Pt called to f/u on PA for Celexa 60mg. Pt would like to know if Rx was approved and if it can be sent in to the pharmacy.

## 2023-11-19 NOTE — PROGRESS NOTES
"This provider is located at [facility] ,[address], [address] in [place], [place],using a secure [employer] Video Visit through [employer]. Patient is being seen remotely via telehealth at their home address in [place], and stated they are in a secure environment for this session. The patient's condition being diagnosed/treated is appropriate for telemedicine. The provider identified herself as well as her credentials.   The patient, and/or patients guardian, consent to be seen remotely, and when consent is given they understand that the consent allows for patient identifiable information to be sent to a third party as needed.   They may refuse to be seen remotely at any time. The electronic data is encrypted and password protected, and the patient and/or guardian has been advised of the potential risks to privacy not withstanding such measures.    Chief Complaint  Depression, anxiety, PTSD, and ADHD in the adult      Subjective          lAisa Saul presents via MyChart Video through [employer] by herself for a follow up and medication check.    History of Present Illness: Alisa states, \" I am absolutely horrible.\"  Alisa tells me that school is terrible right now for her.  She is failing her pathophysiology course.  She took a test about 2 weeks ago and failed.  This now puts her at needing an 88% on her final test to pass the course.  She is feeling overwhelmed and panicky.  She tells me that she the test anxiety is getting the best of her and she is struggling to focus.  She is also having physical symptoms of feeling restless, poor focus, and mental exhaustion.  She feels sweaty and shaky when she is taking tests.  She is studying as much as possible and has added tutoring onto her schedule.  She is using her accommodations given through the [employer] for [facility] and she is trying to find extra resources as well.  She feels overwhelmed and helpless at times.  Her " suicidal ideations have increased in intensity and frequency to the point where the patient has been concerned for her own safety.  She has developed a plan such as putting her car in a garage and exposing herself to the carbon dioxide.  She has also thought about overdosing on medications.  She has thought about going into the bathtub and cutting her wrist.  She also mentioned taking her car off of a bridge or driving at high speeds into an object.  She has been talking with her family about how she is feeling.  She tries to find ways to distract her thoughts and tries to remind herself of reasons why she should not commit suicide if she fails her next test.  She has considered asking her mother, sister, or her best friend to come and stay with her.  The next test is on December 13.  She endorses motivation to make medication adjustments and is in agreement to safety plan with this provider.  She continues to struggle with sleep and fears taking any medications which may cause her to oversleep.  She has stopped doxepin and has resumed taking Vistaril at night. She reports compliance with medications. She has tried: Mirtazapine, Lexapro, Zoloft, Effexor, BuSpar, Trintellix, and Wellbutrin. Currently, she is taking Vistaril, Celexa, Vyvanse, Lamotrigine, Wellbutrin XL and BuSpar.  She denies any side effects.  She denies any HI/AVH.    Current Medications:   Current Outpatient Medications   Medication Sig Dispense Refill    albuterol sulfate  (90 Base) MCG/ACT inhaler Inhale 2 puffs.      buPROPion XL (WELLBUTRIN XL) 300 MG 24 hr tablet TAKE 1 TABLET BY MOUTH EVERY MORNING 30 tablet 2    busPIRone (BUSPAR) 15 MG tablet TAKE 1 TABLET BY MOUTH TWICE DAILY 60 tablet 2    citalopram (CeleXA) 40 MG tablet Take 1.5 tablets by mouth Daily. 45 tablet 2    Humira Pen 40 MG/0.4ML Pen-injector Kit       hydrOXYzine (ATARAX) 25 MG tablet Take 1 tablet by mouth 3 (Three) Times a Day As Needed for Anxiety. 90 tablet 2     hydrOXYzine pamoate (VISTARIL) 25 MG capsule TAKE 1 CAPSULE BY MOUTH 3 (THREE) TIMES A DAY AS NEEDED FOR ITCHING. 270 capsule 1    lamoTRIgine (LaMICtal) 150 MG tablet Take 1 tablet by mouth 2 (Two) Times a Day. 60 tablet 2    levocetirizine (XYZAL) 5 MG tablet Take 1 tablet by mouth.      lisdexamfetamine (Vyvanse) 70 MG capsule Take 1 capsule by mouth Every Morning 30 capsule 0    Low-Ogestrel 0.3-30 MG-MCG per tablet TAKE 1 TABLET BY MOUTH 1 TIME EACH DAY.      Magnesium Oxide -Mg Supplement 500 MG tablet Take 1 tablet by mouth Daily.      melatonin 5 MG sublingual tablet sublingual tablet Place 1 tablet under the tongue Every Night. Dual action      montelukast (SINGULAIR) 10 MG tablet Take 1 tablet by mouth Every Night.      ondansetron ODT (ZOFRAN-ODT) 4 MG disintegrating tablet       pantoprazole (PROTONIX) 40 MG EC tablet Take 1 tablet by mouth.      promethazine (PHENERGAN) 25 MG tablet Take 1 tablet by mouth Every 6 (Six) Hours As Needed.      Promethegan 25 MG suppository       amphetamine-dextroamphetamine (Adderall) 10 MG tablet Take 1 tablet by mouth Daily. 30 tablet 0    cloNIDine (Catapres) 0.1 MG tablet Take 1 tablet by mouth Every Night. 30 tablet 2     No current facility-administered medications for this visit.         Objective   Vital Signs:   There were no vitals taken for this visit.    Physical Exam  Nursing note reviewed. Vitals reviewed: No vitals due to telehealth visit.  Constitutional:       Appearance: Normal appearance. She is well-developed. She is obese.   Neurological:      General: No focal deficit present.      Mental Status: She is alert and oriented to person, place, and time.   Psychiatric:         Attention and Perception: Attention and perception normal.         Mood and Affect: Mood is depressed. Affect is tearful.         Speech: Speech normal.         Behavior: Behavior normal. Behavior is cooperative.         Thought Content: Thought content normal. Thought content  includes suicidal ideation.         Cognition and Memory: Cognition and memory normal.         Judgment: Judgment normal.        Result Review :     The following data was reviewed by: THELMA Hubbard on 11/21/2023:         Assessment and Plan    Diagnoses and all orders for this visit:    1. Severe episode of recurrent major depressive disorder, without psychotic features (Primary)  -     lamoTRIgine (LaMICtal) 150 MG tablet; Take 1 tablet by mouth 2 (Two) Times a Day.  Dispense: 60 tablet; Refill: 2    2. ADHD (attention deficit hyperactivity disorder), inattentive type  -     lisdexamfetamine (Vyvanse) 70 MG capsule; Take 1 capsule by mouth Every Morning  Dispense: 30 capsule; Refill: 0  -     amphetamine-dextroamphetamine (Adderall) 10 MG tablet; Take 1 tablet by mouth Daily.  Dispense: 30 tablet; Refill: 0    3. CRISTINA (generalized anxiety disorder)  -     lamoTRIgine (LaMICtal) 150 MG tablet; Take 1 tablet by mouth 2 (Two) Times a Day.  Dispense: 60 tablet; Refill: 2  -     cloNIDine (Catapres) 0.1 MG tablet; Take 1 tablet by mouth Every Night.  Dispense: 30 tablet; Refill: 2    4. Other insomnia    5. Post traumatic stress disorder (PTSD)         Mental Status Exam:   Hygiene:   good  Cooperation:  Cooperative  Eye Contact:  Good  Psychomotor Behavior:  Appropriate  Affect:   Tearful  Mood: depressed  Speech:  Normal  Thought Process:  Goal directed and Linear  Thought Content:  Normal  Suicidal:  Suicidal Ideation and Suicidal plan  Homicidal:  None  Hallucinations:  None  Delusion:  None  Memory:  Intact  Orientation:  Person, Place, Time and Situation  Reliability:  good  Insight:  Good  Judgement:  Good  Impulse Control:  Good  Physical/Medical Issues:  Yes Hidradenitis suppurativa, chronic back pain, endometriosis, and arthritis       PHQ-9 Score:   PHQ-9 Total Score: (P) 25    Impression/Plan:  -This is a follow up and medication check. Alisa reports her symptoms of depression with  suicidal ideations, intent, and plan.  She has been overwhelmed and anxious due to failing a test in her pathophysiology course.  This has put her in a position that she must get an 88% on her final exam in order to move forward.  She is struggling with test taking anxiety.  She has tried to manage her anxious thoughts but feels that her coping strategies and medications are not working at this time.  Her thoughts are spiraling and she feels panic and physical symptoms of anxiety.  This gets particularly worse when it comes to test taking.  She does feel her accommodations through the [employer] had been helpful.  She also added tutoring and other resources to help her in this course.  Today, we discussed her suicidal ideations and plans.  She reports feeling concerned for her own safety at times.  She has developed several ways that she has considered killing herself if she does not pass this course.  She has talked with her friends and family.  She tries to remind herself that her animals need her and how her family would feel if she acted on her suicidal thoughts.  I encouraged her to contact her mother and discuss her plan and ask for help.  I encouraged her to have her mother present for support when she is taking the test on December 13th.  I also ask if I could call and check in on her 1 to 2 days before the test and she agrees.  We discussed changing her medications to improve symptoms of depression and suicidal ideations.  I asked the patient if she would agree not to use her psychiatric medications to harm herself and she fully agrees to not acting on any plans to use medication to harm herself.  I asked if I needed to contact her mother to safety plan and she adamantly denies and agrees to contacting her herself.  She will continue to use accommodations through the [employer].  She has also looked into nearby psychiatric facilities that she could have an inpatient admission if needed.  She does not feel  she needs to go inpatient at this time but will consider as an options should her suicidal ideations or intent worsen.  Therefore, I suggested that we use clonidine at night for anxiety, ADHD, and sleep.  We will hold doxepin and Vistaril for now.  She can use Atarax as needed for anxiety and we will add a breakthrough dose of Adderall to use in the afternoon if needed while studying.  She agrees to these changes and agrees to the safety planning discussed.  -Stop doxepin and Vistaril due to sedation.  -Initiate Adderall 10 mg daily for ADHD symptoms.  -Initiate clonidine 0.1 mg nightly for anxiety, ADHD symptoms, and sleep.  -Change lamotrigine to 150 mg twice daily for depression and anxiety.    -Continue Celexa 60 mg daily for depression and anxiety. Patient has refills.   -Continue Atarax 25 mg 3 times daily as needed for anxiety and sleep.  Patient has refills.   -Continue Vyvanse 70 mg daily for ADHD.    -Continue Wellbutrin  mg daily for depression. Patient has refills.  -Continue BuSpar 15 mg twice daily for anxiety. Patient has refills.    -Continue therapy PRN.  -Last urine drug screen 01/09/2923.  Appropriate.  -The DESIRAE report, reviewed through PDMP, of the past 12 months were reviewed and is appropriate.  The patient/guardian reports taking the medication only as prescribed.  The patient/guardian denies any abuse or misuse of the medication.  The patient/guardian denies any other substance use or issues.  There are no apparent substance related issues.  The patient reports no side effects of the current medication usage.  The patient/guardian has reported significant improvement with medication usage and wishes to continue medication as prescribed.  The patient/guardian is appropriate to continue with current medication usage at this time.  Reinforced risks and side effects of medication usage, patient and/or guardian verbalize understanding in their own words and are in agreement with current  plan.      MEDS ORDERED DURING VISIT:  New Medications Ordered This Visit   Medications    lisdexamfetamine (Vyvanse) 70 MG capsule     Sig: Take 1 capsule by mouth Every Morning     Dispense:  30 capsule     Refill:  0    lamoTRIgine (LaMICtal) 150 MG tablet     Sig: Take 1 tablet by mouth 2 (Two) Times a Day.     Dispense:  60 tablet     Refill:  2    cloNIDine (Catapres) 0.1 MG tablet     Sig: Take 1 tablet by mouth Every Night.     Dispense:  30 tablet     Refill:  2    amphetamine-dextroamphetamine (Adderall) 10 MG tablet     Sig: Take 1 tablet by mouth Daily.     Dispense:  30 tablet     Refill:  0     I spent 48 minutes caring for Alisa on this date of service. This time includes time spent by me in the following activities:preparing for the visit, performing a medically appropriate examination and/or evaluation , counseling and educating the patient/family/caregiver, ordering medications, tests, or procedures, documenting information in the medical record, and care coordination     Follow Up   Return in about 4 weeks (around 12/19/2023) for Medication Check.  Patient was given instructions and counseling regarding her condition or for health maintenance advice. Please see specific information pulled into the AVS if appropriate.       TREATMENT PLAN/GOALS: Continue supportive psychotherapy efforts and medications as indicated. Treatment and medication options discussed during today's visit. Patient acknowledged and verbally consented to continue with current treatment plan and was educated on the importance of compliance with treatment and follow-up appointments.    MEDICATION ISSUES:  Discussed medication options and treatment plan of prescribed medication as well as the risks, benefits, and side effects including potential falls, possible impaired driving and metabolic adversities among others. Patient is agreeable to call the office with any worsening of symptoms or onset of side effects. Patient is  agreeable to call 911 or go to the nearest ER should he/she begin having SI/HI.        This document has been electronically signed by THELMA Henriquez, PMHNP-BC  November 21, 2023 16:51 EST    Part of this note may be an electronic transcription/translation of spoken language to printed text using the Dragon Dictation System.    This encounter note was scribed for THELMA Hubbard  by Angie Mcgrath, student NP.  I, THELMA Hubbard , personally performed the services described in this documentation as scribed by the above named individual in my presence, and it is both accurate and complete.     04/12/2023  11:55 EDT        [facility] Patient Safety Plan  This Safety Plan Should be printed, and a copy should be placed on a refrigerator, mirror, etc. for easy visibility. A copy should be kept in your billfold or purse and a copy should be on your smart phone for easy retrieval.     11/21/2023   Patient Name: Alisa Saul  YOB: 1995    Alisa participated filling out her safety plan in collaboration with THELMA Hubbard on 11/21/2023.    Suicide Prevention Hotline:   Call 123 or 1-719.523.1410 or text Talk at 471-868

## 2023-11-21 ENCOUNTER — TELEMEDICINE (OUTPATIENT)
Dept: PSYCHIATRY | Facility: CLINIC | Age: 28
End: 2023-11-21

## 2023-11-21 DIAGNOSIS — F41.1 GAD (GENERALIZED ANXIETY DISORDER): Chronic | ICD-10-CM

## 2023-11-21 DIAGNOSIS — F33.2 SEVERE EPISODE OF RECURRENT MAJOR DEPRESSIVE DISORDER, WITHOUT PSYCHOTIC FEATURES: Primary | Chronic | ICD-10-CM

## 2023-11-21 DIAGNOSIS — F43.10 POST TRAUMATIC STRESS DISORDER (PTSD): Chronic | ICD-10-CM

## 2023-11-21 DIAGNOSIS — G47.09 OTHER INSOMNIA: Chronic | ICD-10-CM

## 2023-11-21 DIAGNOSIS — F90.0 ADHD (ATTENTION DEFICIT HYPERACTIVITY DISORDER), INATTENTIVE TYPE: Chronic | ICD-10-CM

## 2023-11-21 PROCEDURE — 99214 OFFICE O/P EST MOD 30 MIN: CPT | Performed by: NURSE PRACTITIONER

## 2023-11-21 RX ORDER — CLONIDINE HYDROCHLORIDE 0.1 MG/1
0.1 TABLET ORAL NIGHTLY
Qty: 30 TABLET | Refills: 2 | Status: SHIPPED | OUTPATIENT
Start: 2023-11-21

## 2023-11-21 RX ORDER — LAMOTRIGINE 150 MG/1
150 TABLET ORAL 2 TIMES DAILY
Qty: 60 TABLET | Refills: 2 | Status: SHIPPED | OUTPATIENT
Start: 2023-11-21

## 2023-11-21 RX ORDER — LISDEXAMFETAMINE DIMESYLATE CAPSULES 70 MG/1
70 CAPSULE ORAL EVERY MORNING
Qty: 30 CAPSULE | Refills: 0 | Status: SHIPPED | OUTPATIENT
Start: 2023-11-21

## 2023-11-21 RX ORDER — DEXTROAMPHETAMINE SACCHARATE, AMPHETAMINE ASPARTATE, DEXTROAMPHETAMINE SULFATE AND AMPHETAMINE SULFATE 2.5; 2.5; 2.5; 2.5 MG/1; MG/1; MG/1; MG/1
10 TABLET ORAL DAILY
Qty: 30 TABLET | Refills: 0 | Status: SHIPPED | OUTPATIENT
Start: 2023-11-21 | End: 2024-11-20

## 2023-11-21 NOTE — PATIENT INSTRUCTIONS
Suicidal Feelings: How to Help Yourself  Suicide is when you end your own life. Suicidal ideation includes expressing thoughts about, or a preoccupation with, ending your own life. There are many things you can do to help yourself feel better when struggling with these feelings. Many services and people are available to support you and others who struggle with similar feelings.  If you ever feel like you may hurt yourself or others, or have thoughts about taking your own life, get help right away. To get help:  Go to your nearest emergency department.  Call your local emergency services (671 in the [place]  Call the [facility] and [facility] services helpline (211 in the [place]  Call or text a suicide hotline to speak with a trained counselor. The following suicide hotlines are available in the [place]:  8-574-466-TALK (1-763.353.8770 or 018 in the [place]  1-776-EOKWSNJ (1-672.925.9306).  Text 190281. This is the Crisis Text Line in the [place]  1-265.774.2684. This is a hotline for Malay speakers.  1-994.511.6656. This is a hotline for TTY users.  6-262-3-U-MO (1-880.719.4038). This is a hotline for lesbian, gotti, bisexual, transgender, or questioning youth.  For a list of hotlines in [place], visit suicide.org/hotlines/international/ffkloe-kkuyupv-rpnpcrxd.html  Contact a crisis center or a local suicide prevention center. To find a crisis center or suicide prevention center:  Call your local hospital, clinic, community service organization, mental health center, social service provider, or health department. Ask for help with connecting to a crisis center.  For a list of crisis centers in the [place], visit: suicidepreventionlifeline.org  For a list of crisis centers in [place], visit: suicideprevention.ca  How to help yourself feel better    Promise yourself that you will not do anything bad or extreme when you have suicidal feelings. Remember the times you have felt hopeful.  Many people have  gotten through suicidal thoughts and feelings, and you can too.  If you have had these feelings before, remind yourself that you can get through them again.  Let family, friends, teachers, or counselors know how you are feeling. Do not separate yourself from those who care about you and want to help you.  Talk with someone every day, even if you do not feel like talking to anyone or being with other people.  Face-to-face conversation is best to help them understand your feelings.  Contact a mental health care provider and work with this person regularly.  Make a safety plan that you can follow during a crisis.  Include phone numbers of suicide prevention hotlines, mental health professionals, and trusted friends and family members you can call during an emergency.  Save these numbers on your phone.  If you are thinking of taking a lot of medicine, give your medicine to someone who can give it to you as prescribed.  If you are on antidepressants and are concerned you will overdose, tell your health care provider so that he or she can give you safer medicines.  Try to stick to your routines and follow a schedule every day. Make self-care a priority.  Make a list of realistic goals, and cross them off when you achieve them. Accomplishments can give you a sense of worth.  Wait until you are feeling better before doing things that you find difficult or unpleasant.  Do things that you have always enjoyed to take your mind off your feelings.  Try reading a book, or listening to or playing music.  Spending time outside, in nature, may help you feel better.  Follow these instructions at home:    Visit your primary health care provider every year for a physical and a mental health checkup.  Take over-the-counter and prescription medicines only as told by your health care provider.  Ask your health care provider about the possible side effects of any medicines you are taking.  Ask your health care provider about whether  suicidal ideation is a possible side effect of any of your medicines.  Learn about suicidal ideation and what increases the risk for the development of suicidal thoughts.  Eat a well-balanced diet, and eat regular meals.  Get plenty of rest.  Exercise if you are able. Just 30 minutes of exercise each day can help you feel better.  Keep your living space well lit.  Do not use alcohol or drugs. Remove these substances from your home.  General recommendations  Remove weapons, poisons, knives, and other deadly items from your home.  Work with a mental health care provider as needed.  When you are feeling well, write yourself a letter with tips and support that you can read when you are not feeling well.  Remember that life's difficulties can be sorted out with help. Conditions can be treated, and you can learn behaviors and ways of thinking that will help you.  Work with your health care provider or counselor to learn ways of coping with your thoughts and feelings.  Where to find more information  National Suicide Prevention Lifeline: www.suicidepreventionlifeline.org  Hopeline: www.hopeline.[employer] [place]  American Foundation for Suicide Prevention: www.afsp.org  The Law Project (for lesbian, gotti, bisexual, transgender, or questioning youth): www.thetrevorproject.org  National [place] of Mental Health: www.nimh.nih.gov/health/topics/suicide-prevention  Suicide Prevention Resources: afsp.org/suicide-prevention-resources  Contact a health care provider if:  You feel as though you are a burden to others.  You feel agitated, angry, vengeful, or have extreme mood swings.  You have withdrawn from family and friends.  You are frequently using drugs or alcohol.  Get help right away if:  You are talking about suicide or wishing to die.  You start making plans for how to commit suicide.  You feel that you have no reason to live.  You start making plans for putting your affairs in order, saying goodbye, or giving your possessions  away.  You feel guilt, shame, or unbearable pain, and it seems like there is no way out.  You are engaging in risky behaviors that could lead to death.  If you have any of these thoughts or symptoms, get help right away:  Go to your nearest emergency department or crisis center.  Call emergency services (911 in the [place]  Call or text a suicide crisis helpline.  Summary  Suicide is when you take your own life. Suicidal feelings are thoughts about ending your own life.  Promise yourself that you will not do anything bad or extreme when you have suicidal feelings.  Let family, friends, teachers, or counselors know how you are feeling.  Get help right away if you start making plans for how to commit suicide.  This information is not intended to replace advice given to you by your health care provider. Make sure you discuss any questions you have with your health care provider.  Document Revised: 07/14/2022 Document Reviewed: 04/28/2022  Elsevier Patient Education © 2023 Elsevier Inc.     NO GLASSES P/PT

## 2023-11-28 ENCOUNTER — TELEPHONE (OUTPATIENT)
Dept: PSYCHIATRY | Facility: CLINIC | Age: 28
End: 2023-11-28

## 2023-11-28 DIAGNOSIS — F90.0 ADHD (ATTENTION DEFICIT HYPERACTIVITY DISORDER), INATTENTIVE TYPE: Chronic | ICD-10-CM

## 2023-11-28 RX ORDER — DEXTROAMPHETAMINE SACCHARATE, AMPHETAMINE ASPARTATE, DEXTROAMPHETAMINE SULFATE AND AMPHETAMINE SULFATE 2.5; 2.5; 2.5; 2.5 MG/1; MG/1; MG/1; MG/1
10 TABLET ORAL DAILY
Qty: 30 TABLET | Refills: 0 | Status: SHIPPED | OUTPATIENT
Start: 2023-11-28 | End: 2024-11-27

## 2023-11-28 NOTE — TELEPHONE ENCOUNTER
Patient called in [facility] [address] is out of stock of adderall needs sent to [address] [facility]. Left voicemail at pharmacy to cancel script due to they was closed until 9 am.     Rx Refill Note  Requested Prescriptions     Pending Prescriptions Disp Refills    amphetamine-dextroamphetamine (Adderall) 10 MG tablet 30 tablet 0     Sig: Take 1 tablet by mouth Daily.      Last office visit with prescribing clinician: 1/9/2023   Last telemedicine visit with prescribing clinician: 11/21/2023   Next office visit with prescribing clinician: 1/9/2024                         Would you like a call back once the refill request has been completed: [] Yes [] No    If the office needs to give you a call back, can they leave a voicemail: [] Yes [] No    Lindsay Downing CMA  11/28/23, 08:47 EST

## 2023-12-14 ENCOUNTER — TELEPHONE (OUTPATIENT)
Dept: PSYCHIATRY | Facility: CLINIC | Age: 28
End: 2023-12-14

## 2023-12-14 NOTE — TELEPHONE ENCOUNTER
Patient called back said no worries that her depression was not great was michelle better but she thinks everything has just been heightened. Final exam was today and she got it finished. She said her mom and sister is with her for support at least thru the weekend.

## 2023-12-14 NOTE — TELEPHONE ENCOUNTER
Awesome. She can call if she needs anything. I am sure she is looking forward to a little break from school.

## 2023-12-14 NOTE — TELEPHONE ENCOUNTER
Called Patient went straight to voicemail left Patient a message to return my call to ask below questions from provider.

## 2023-12-14 NOTE — TELEPHONE ENCOUNTER
Lindsay,    Can you call and check on Alisa to see ow her depression is doing. Tell her I am sorry that I did not call Monday, I must have set the date wrong and got the reminder today. Thank you.

## 2024-01-03 DIAGNOSIS — F90.0 ADHD (ATTENTION DEFICIT HYPERACTIVITY DISORDER), INATTENTIVE TYPE: Chronic | ICD-10-CM

## 2024-01-03 RX ORDER — LISDEXAMFETAMINE DIMESYLATE CAPSULES 70 MG/1
70 CAPSULE ORAL EVERY MORNING
Qty: 30 CAPSULE | Refills: 0 | Status: SHIPPED | OUTPATIENT
Start: 2024-01-03

## 2024-01-06 NOTE — PROGRESS NOTES
"This provider is located at [facility] ,[address], [address] in [place], [place],using a secure MyChart Video Visit through [employer]/. Patient is being seen remotely via telehealth at their home address in [place], and stated they are in a secure environment for this session. The patient's condition being diagnosed/treated is appropriate for telemedicine. The provider identified herself as well as her credentials.   The patient, and/or patients guardian, consent to be seen remotely, and when consent is given they understand that the consent allows for patient identifiable information to be sent to a third party as needed.   They may refuse to be seen remotely at any time. The electronic data is encrypted and password protected, and the patient and/or guardian has been advised of the potential risks to privacy not withstanding such measures.    Chief Complaint  Depression, anxiety, PTSD, insomnia, and ADHD in the adult      Subjective          Alisa Saul presents via MyChart Video through [employer] by herself for a follow up and medication check.    History of Present Illness: Alisa states, \"I am much better than last time.\"  Alisa tells me that she was able to make it through her last final exams.  She passed all of her classes, but notes that it was not without difficulty.  She was experiencing a major depressive episode with intense suicidal ideations at the time.  She became fearful that she may act on her suicidal thoughts so she asked her sister and mother to come during finals for support.  She also turned to the University for support as well.  She has discovered from this experience that it is okay to ask for help and asking earlier helps as well.  She also discovered that her mom can be very supportive when she needs to be and was a good listener without judging her or making her feel worse.  She felt her  and the testing center was very " helpful for preparing her for the test.  She also studied with a classmate which was helpful.  She feels more prepared headed into the next term as she tells me that it is basically a repeat of the material, but a busier schedule with clinicals.  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  She tells me that she always struggles with feelings of guilt and self-doubt which interfere with her ability to make decisions and concentrate.  She has found the added dose of Adderall for breakthrough ADHD symptoms to be very helpful and was taking daily while in school.  She notes that her depression was significantly worse at last visit, but now her anxiety has increased.  She also found clonidine to be helpful and needs to get back on her routine for sleep hygiene.  She also found the change in lamotrigine to be helpful for her mood.  She endorses passive suicidal ideations, but adamantly denies any intent or plan. She reports compliance with medications. She has tried: Mirtazapine, Lexapro, Zoloft, Effexor, BuSpar, Trintellix, and Wellbutrin. Currently, she is taking Clonidine, Adderall, Celexa, Vyvanse, Lamotrigine, Wellbutrin XL and BuSpar.  She denies any side effects.  She denies any HI/AVH.    Current Medications:   Current Outpatient Medications   Medication Sig Dispense Refill    albuterol sulfate  (90 Base) MCG/ACT inhaler Inhale 2 puffs.      amphetamine-dextroamphetamine (Adderall) 10 MG tablet Take 1 tablet by mouth Daily. 30 tablet 0    Biotin 10 MG capsule Take 10 mg by mouth.      buPROPion XL (WELLBUTRIN XL) 300 MG 24 hr tablet Take 1 tablet by mouth Every Morning. 30 tablet 2    busPIRone (BUSPAR) 15 MG tablet Take 1 tablet by mouth 4 (Four) Times a Day. 120 tablet 2    citalopram (CeleXA) 40 MG tablet Take 1.5 tablets by mouth Daily. 45 tablet 2    clindamycin (CLEOCIN T) 1 % lotion       cloNIDine (Catapres) 0.1 MG tablet Take 1 tablet by mouth Every Night. 30 tablet 2    Humira  Pen 40 MG/0.4ML Pen-injector Kit       hydrOXYzine (ATARAX) 25 MG tablet Take 1 tablet by mouth 3 (Three) Times a Day As Needed for Anxiety. 90 tablet 2    lamoTRIgine (LaMICtal) 150 MG tablet Take 1 tablet by mouth 2 (Two) Times a Day. 60 tablet 2    levocetirizine (XYZAL) 5 MG tablet Take 1 tablet by mouth.      lisdexamfetamine (Vyvanse) 70 MG capsule Take 1 capsule by mouth Every Morning 30 capsule 0    Low-Ogestrel 0.3-30 MG-MCG per tablet TAKE 1 TABLET BY MOUTH 1 TIME EACH DAY.      Magnesium Oxide -Mg Supplement 500 MG tablet Take 1 tablet by mouth Daily.      melatonin 5 MG sublingual tablet sublingual tablet Place 1 tablet under the tongue Every Night. Dual action      montelukast (SINGULAIR) 10 MG tablet Take 1 tablet by mouth Every Night.      ondansetron ODT (ZOFRAN-ODT) 4 MG disintegrating tablet       pantoprazole (PROTONIX) 40 MG EC tablet Take 1 tablet by mouth.      promethazine (PHENERGAN) 25 MG tablet Take 1 tablet by mouth Every 6 (Six) Hours As Needed.      Promethegan 25 MG suppository       vitamin B-12 (CYANOCOBALAMIN) 1000 MCG tablet Take 1 tablet by mouth.       No current facility-administered medications for this visit.         Objective   Vital Signs:   There were no vitals taken for this visit.    Physical Exam  Nursing note reviewed. Vitals reviewed: No vitals due to telehealth visit.  Constitutional:       Appearance: Normal appearance. She is well-developed. She is obese.   Neurological:      General: No focal deficit present.      Mental Status: She is alert and oriented to person, place, and time.   Psychiatric:         Attention and Perception: Attention and perception normal.         Mood and Affect: Mood is anxious and depressed (improved).         Speech: Speech normal.         Behavior: Behavior normal. Behavior is cooperative.         Thought Content: Thought content normal. Thought content includes suicidal (passive without intent or plan) ideation.         Cognition and  Memory: Cognition and memory normal.         Judgment: Judgment normal.        Result Review :     The following data was reviewed by: THELMA Hubbard on 01/09/2024:         Assessment and Plan    Diagnoses and all orders for this visit:    1. ADHD (attention deficit hyperactivity disorder), inattentive type (Primary)  -     amphetamine-dextroamphetamine (Adderall) 10 MG tablet; Take 1 tablet by mouth Daily.  Dispense: 30 tablet; Refill: 0  -     Urine Drug Screen - Urine, Clean Catch; Future    2. Moderate recurrent major depression  -     buPROPion XL (WELLBUTRIN XL) 300 MG 24 hr tablet; Take 1 tablet by mouth Every Morning.  Dispense: 30 tablet; Refill: 2  -     citalopram (CeleXA) 40 MG tablet; Take 1.5 tablets by mouth Daily.  Dispense: 45 tablet; Refill: 2    3. CRISTINA (generalized anxiety disorder)  -     busPIRone (BUSPAR) 15 MG tablet; Take 1 tablet by mouth 4 (Four) Times a Day.  Dispense: 120 tablet; Refill: 2  -     citalopram (CeleXA) 40 MG tablet; Take 1.5 tablets by mouth Daily.  Dispense: 45 tablet; Refill: 2  -     hydrOXYzine (ATARAX) 25 MG tablet; Take 1 tablet by mouth 3 (Three) Times a Day As Needed for Anxiety.  Dispense: 90 tablet; Refill: 2    4. Other insomnia    5. Post traumatic stress disorder (PTSD)    6. Medication management  -     Urine Drug Screen - Urine, Clean Catch; Future           Mental Status Exam:   Hygiene:   good  Cooperation:  Cooperative  Eye Contact:  Good  Psychomotor Behavior:  Appropriate  Affect:  Appropriate  Mood: depressed, anxious, and depression improved  Speech:  Normal  Thought Process:  Goal directed and Linear  Thought Content:  Normal  Suicidal:  Suicidal Ideation and passive without intent or plan  Homicidal:  None  Hallucinations:  None  Delusion:  None  Memory:  Intact  Orientation:  Person, Place, Time and Situation  Reliability:  good  Insight:  Good  Judgement:  Good  Impulse Control:  Good  Physical/Medical Issues:  Yes Hidradenitis  suppurativa, chronic back pain, endometriosis, and arthritis       PHQ-9 Score:   PHQ-9 Total Score: (P) 21    Impression/Plan:  -This is a follow up and medication check. Alisa reports improved symptoms of depression.  She feels passing her final exams and medication adjustments to be helpful.  She reports higher levels of anxiety going into her next semester.  Today, we spent time reflecting on lessons learned this past semester.  She is able to identify several lessons which will help her in the future with school or work.  She is willing to ask for help and understand that asking earlier can be beneficial.  She is also willing to use her resources and not feel as if she should be able to do everything by herself.  We explored her guilt feelings and insecurities.  I encouraged her to find ways such as journaling or positive affirmations to remind herself of all her positive qualities and strengths.  I again reminded her this will help her in school and in her future job.  She feels the most recent medication adjustments of adding Adderall and clonidine and changing lamotrigine to be very helpful.  She had used several doses of her mother's Ativan after forgetting hydroxyzine in [place] while home for the John holiday.  She found it to be very beneficial for her anxiety and panic.  We spent time discussing the use of benzodiazepines and I explained that it is not evidence-based for use for those who use stimulants, unless absolutely necessary.  I encouraged her to find other ways to manage anxiety such as hydroxyzine, BuSpar, her coping skills.  She feels that an increase in BuSpar could be helpful versus trying a benzodiazepine.  She verbalizes her understanding of why it is concerned to use these medications.  I also explained there are studies which show a contraindication in those diagnosed with PTSD and use of benzodiazepines, as well as long-term risk of using benzodiazepines such as cognitive  impairment and respiratory depression.  She again verbalizes her understanding.  She is aware that it is time to complete a urine drug screen for compliance and I also explained the reinitiation of the [place] act.  Due to her school schedule, we will wait to have her in person appointment until the summer.  -Increase BuSpar 15 mg to 4 times daily for anxiety. Patient will take a dose twice daily scheduled and use 1-2 doses as needed throughout the day for anxiety.  -Continue Adderall 10 mg daily for ADHD symptoms.  -Continue clonidine 0.1 mg nightly for anxiety, ADHD symptoms, and sleep.  -Change lamotrigine to 150 mg twice daily for depression and anxiety.  Patient has refills.  -Continue Celexa 60 mg daily for depression and anxiety.   -Continue Atarax 25 mg 3 times daily as needed for anxiety and sleep.   -Continue Vyvanse 70 mg daily for ADHD.   Patient has refills.   -Continue Wellbutrin  mg daily for depression.   -Continue therapy PRN.  -Last urine drug screen 01/09/2023.  Appropriate.  I ordered a UDS for compliance.  Patient is aware.  -The DESIRAE report, reviewed through PDMP, of the past 12 months were reviewed and is appropriate.  The patient/guardian reports taking the medication only as prescribed.  The patient/guardian denies any abuse or misuse of the medication.  The patient/guardian denies any other substance use or issues.  There are no apparent substance related issues.  The patient reports no side effects of the current medication usage.  The patient/guardian has reported significant improvement with medication usage and wishes to continue medication as prescribed.  The patient/guardian is appropriate to continue with current medication usage at this time.  Reinforced risks and side effects of medication usage, patient and/or guardian verbalize understanding in their own words and are in agreement with current plan.      MEDS ORDERED DURING VISIT:  New Medications Ordered This Visit    Medications    amphetamine-dextroamphetamine (Adderall) 10 MG tablet     Sig: Take 1 tablet by mouth Daily.     Dispense:  30 tablet     Refill:  0    buPROPion XL (WELLBUTRIN XL) 300 MG 24 hr tablet     Sig: Take 1 tablet by mouth Every Morning.     Dispense:  30 tablet     Refill:  2    busPIRone (BUSPAR) 15 MG tablet     Sig: Take 1 tablet by mouth 4 (Four) Times a Day.     Dispense:  120 tablet     Refill:  2    citalopram (CeleXA) 40 MG tablet     Sig: Take 1.5 tablets by mouth Daily.     Dispense:  45 tablet     Refill:  2    hydrOXYzine (ATARAX) 25 MG tablet     Sig: Take 1 tablet by mouth 3 (Three) Times a Day As Needed for Anxiety.     Dispense:  90 tablet     Refill:  2     I spent 53 minutes caring for Alisa on this date of service. This time includes time spent by me in the following activities:preparing for the visit, performing a medically appropriate examination and/or evaluation , counseling and educating the patient/family/caregiver, ordering medications, tests, or procedures, documenting information in the medical record, and care coordination     Follow Up   Return in about 2 months (around 3/9/2024) for Medication Check.  Patient was given instructions and counseling regarding her condition or for health maintenance advice. Please see specific information pulled into the AVS if appropriate.       TREATMENT PLAN/GOALS: Continue supportive psychotherapy efforts and medications as indicated. Treatment and medication options discussed during today's visit. Patient acknowledged and verbally consented to continue with current treatment plan and was educated on the importance of compliance with treatment and follow-up appointments.    MEDICATION ISSUES:  Discussed medication options and treatment plan of prescribed medication as well as the risks, benefits, and side effects including potential falls, possible impaired driving and metabolic adversities among others. Patient is agreeable to call the  office with any worsening of symptoms or onset of side effects. Patient is agreeable to call 911 or go to the nearest ER should he/she begin having SI/HI.        This document has been electronically signed by THELMA Henriquez, PMHNP-BC  January 9, 2024 12:33 EST    Part of this note may be an electronic transcription/translation of spoken language to printed text using the Dragon Dictation System.    This encounter note was scribed for THELMA Hubbard  by Angie Mcgrath, student NP.  I, THELMA Hubbard , personally performed the services described in this documentation as scribed by the above named individual in my presence, and it is both accurate and complete.     04/12/2023  11:55 EDT        [facility] [place]  This Safety Plan Should be printed, and a copy should be placed on a refrigerator, mirror, etc. for easy visibility. A copy should be kept in your billfold or purse and a copy should be on your smart phone for easy retrieval.     1/9/2024   Patient Name: Alisa Saul  YOB: 1995    Alisa participated filling out her safety plan in collaboration with THELMA Hubbard on 1/9/2024.    Suicide Prevention Hotline:   Call 057 or 1-342.716.7764 or text Talk at 248-300

## 2024-01-09 ENCOUNTER — TELEMEDICINE (OUTPATIENT)
Dept: PSYCHIATRY | Facility: CLINIC | Age: 29
End: 2024-01-09
Payer: MEDICAID

## 2024-01-09 DIAGNOSIS — Z79.899 MEDICATION MANAGEMENT: ICD-10-CM

## 2024-01-09 DIAGNOSIS — F41.1 GAD (GENERALIZED ANXIETY DISORDER): Chronic | ICD-10-CM

## 2024-01-09 DIAGNOSIS — F90.0 ADHD (ATTENTION DEFICIT HYPERACTIVITY DISORDER), INATTENTIVE TYPE: Primary | Chronic | ICD-10-CM

## 2024-01-09 DIAGNOSIS — G47.09 OTHER INSOMNIA: Chronic | ICD-10-CM

## 2024-01-09 DIAGNOSIS — F43.10 POST TRAUMATIC STRESS DISORDER (PTSD): Chronic | ICD-10-CM

## 2024-01-09 DIAGNOSIS — F33.1 MODERATE RECURRENT MAJOR DEPRESSION: Chronic | ICD-10-CM

## 2024-01-09 RX ORDER — DEXTROAMPHETAMINE SACCHARATE, AMPHETAMINE ASPARTATE, DEXTROAMPHETAMINE SULFATE AND AMPHETAMINE SULFATE 2.5; 2.5; 2.5; 2.5 MG/1; MG/1; MG/1; MG/1
10 TABLET ORAL DAILY
Qty: 30 TABLET | Refills: 0 | Status: SHIPPED | OUTPATIENT
Start: 2024-01-09 | End: 2025-01-08

## 2024-01-09 RX ORDER — CLINDAMYCIN PHOSPHATE 10 UG/ML
LOTION TOPICAL
COMMUNITY
Start: 2023-09-25

## 2024-01-09 RX ORDER — BIOTIN 10000 MCG
10 CAPSULE ORAL
COMMUNITY

## 2024-01-09 RX ORDER — BUPROPION HYDROCHLORIDE 300 MG/1
300 TABLET ORAL EVERY MORNING
Qty: 30 TABLET | Refills: 2 | Status: SHIPPED | OUTPATIENT
Start: 2024-01-09

## 2024-01-09 RX ORDER — BUSPIRONE HYDROCHLORIDE 15 MG/1
15 TABLET ORAL 4 TIMES DAILY
Qty: 120 TABLET | Refills: 2 | Status: SHIPPED | OUTPATIENT
Start: 2024-01-09

## 2024-01-09 RX ORDER — HYDROXYZINE HYDROCHLORIDE 25 MG/1
25 TABLET, FILM COATED ORAL 3 TIMES DAILY PRN
Qty: 90 TABLET | Refills: 2 | Status: SHIPPED | OUTPATIENT
Start: 2024-01-09

## 2024-01-09 RX ORDER — CITALOPRAM 40 MG/1
60 TABLET ORAL DAILY
Qty: 45 TABLET | Refills: 2 | Status: SHIPPED | OUTPATIENT
Start: 2024-01-09

## 2024-01-09 RX ORDER — LANOLIN ALCOHOL/MO/W.PET/CERES
1000 CREAM (GRAM) TOPICAL
COMMUNITY

## 2024-02-02 ENCOUNTER — TELEPHONE (OUTPATIENT)
Dept: PSYCHIATRY | Facility: CLINIC | Age: 29
End: 2024-02-02
Payer: MEDICAID

## 2024-02-02 NOTE — TELEPHONE ENCOUNTER
Patient called in to see if she could get in to see provider somewhere around 02/08/2024-02/13/2024 she will be in town maybe able to come  earlier or stay later if she can get in with provider has been added to wait list.    Patient reported not as bad as she was in December but has had some life changes come up. Will be taking a medical leave from school. Not a crisis but very anxious, very down, overwhelmed, anxiety is high. Will need a letter written from provider to take a leave from school. Also wants to know if Gisell has any recommendation or colleagues who can see her in [place] for therapy.

## 2024-02-05 NOTE — TELEPHONE ENCOUNTER
I can write a letter but may need more specifics. Lindsay, can you talk to Alisa about purpose of leave and dates that will need to be included? I would suggest an online therapy like [facility], etc. while in [place].

## 2024-02-05 NOTE — TELEPHONE ENCOUNTER
I will send Patient a message I have called this morning and left a message to offer appointment 02/06/2024 not sure she can be in town or not that soon.

## 2024-02-06 ENCOUNTER — OFFICE VISIT (OUTPATIENT)
Dept: PSYCHIATRY | Facility: CLINIC | Age: 29
End: 2024-02-06

## 2024-02-06 VITALS
HEART RATE: 86 BPM | SYSTOLIC BLOOD PRESSURE: 138 MMHG | WEIGHT: 293 LBS | OXYGEN SATURATION: 99 % | BODY MASS INDEX: 44.41 KG/M2 | HEIGHT: 68 IN | DIASTOLIC BLOOD PRESSURE: 76 MMHG

## 2024-02-06 DIAGNOSIS — Z79.899 MEDICATION MANAGEMENT: ICD-10-CM

## 2024-02-06 DIAGNOSIS — G47.09 OTHER INSOMNIA: Chronic | ICD-10-CM

## 2024-02-06 DIAGNOSIS — F41.1 GAD (GENERALIZED ANXIETY DISORDER): Primary | Chronic | ICD-10-CM

## 2024-02-06 DIAGNOSIS — F90.0 ADHD (ATTENTION DEFICIT HYPERACTIVITY DISORDER), INATTENTIVE TYPE: Chronic | ICD-10-CM

## 2024-02-06 DIAGNOSIS — F33.1 MODERATE RECURRENT MAJOR DEPRESSION: Chronic | ICD-10-CM

## 2024-02-06 DIAGNOSIS — F43.10 POST TRAUMATIC STRESS DISORDER (PTSD): Chronic | ICD-10-CM

## 2024-02-06 PROCEDURE — 99214 OFFICE O/P EST MOD 30 MIN: CPT | Performed by: NURSE PRACTITIONER

## 2024-02-06 RX ORDER — LISDEXAMFETAMINE DIMESYLATE CAPSULES 70 MG/1
70 CAPSULE ORAL EVERY MORNING
Qty: 30 CAPSULE | Refills: 0 | Status: SHIPPED | OUTPATIENT
Start: 2024-02-06

## 2024-02-06 RX ORDER — DEXTROAMPHETAMINE SACCHARATE, AMPHETAMINE ASPARTATE, DEXTROAMPHETAMINE SULFATE AND AMPHETAMINE SULFATE 5; 5; 5; 5 MG/1; MG/1; MG/1; MG/1
10 TABLET ORAL DAILY
Qty: 15 TABLET | Refills: 0 | Status: SHIPPED | OUTPATIENT
Start: 2024-02-06

## 2024-02-06 NOTE — PROGRESS NOTES
"Chief Complaint  Depression, anxiety, PTSD, insomnia, and ADHD in the adult      Subjective          Alisa Saul presents to [facility] by herself for a follow up and medication check.    History of Present Illness: Alisa states, \"I started this semester and got a feeling that I wasn't ready.\" Alisa tells me she barely passed her last semester due to an exacerbation of depression. Therefore, she realized, she needs to make her mental health a priority in order for her to accomplish her goals with graduate school. She talked with her  and she can withdrawal for medical/mental health reasons with a letter supported by provider. She is looking to start therapy too. She is doing well with her medications, but due to the cardiovascular risks of an increased dose of Celexa, she wishes to taper down to 40 mg again. She finds anxiety is worse at night, but is improving as she gets her school situation taken care of. She can use Clonidine or Hydroxyzine, both medications she finds helpful. She still deals with the national stimulant shortage at times.  She has tried: Mirtazapine, Lexapro, Zoloft, Effexor, BuSpar, Trintellix, and Wellbutrin. Currently, she is taking Clonidine, Adderall, Celexa, Vyvanse, Lamotrigine, Wellbutrin XL and BuSpar.  She denies any side effects.  She denies any SI/HI/AVH.    Current Medications:   Current Outpatient Medications   Medication Sig Dispense Refill    albuterol sulfate  (90 Base) MCG/ACT inhaler Inhale 2 puffs.      Biotin 10 MG capsule Take 10 mg by mouth.      buPROPion XL (WELLBUTRIN XL) 300 MG 24 hr tablet Take 1 tablet by mouth Every Morning. 30 tablet 2    busPIRone (BUSPAR) 15 MG tablet Take 1 tablet by mouth 4 (Four) Times a Day. 120 tablet 2    citalopram (CeleXA) 40 MG tablet Take 1.5 tablets by mouth Daily. 45 tablet 2    clindamycin (CLEOCIN T) 1 % lotion       cloNIDine (Catapres) 0.1 MG tablet Take 1 tablet " "by mouth Every Night. 30 tablet 2    Humira Pen 40 MG/0.4ML Pen-injector Kit       hydrOXYzine (ATARAX) 25 MG tablet Take 1 tablet by mouth 3 (Three) Times a Day As Needed for Anxiety. 90 tablet 2    lamoTRIgine (LaMICtal) 150 MG tablet Take 1 tablet by mouth 2 (Two) Times a Day. 60 tablet 2    levocetirizine (XYZAL) 5 MG tablet Take 1 tablet by mouth.      lisdexamfetamine (Vyvanse) 70 MG capsule Take 1 capsule by mouth Every Morning 30 capsule 0    Low-Ogestrel 0.3-30 MG-MCG per tablet TAKE 1 TABLET BY MOUTH 1 TIME EACH DAY.      Magnesium Oxide -Mg Supplement 500 MG tablet Take 1 tablet by mouth Daily.      melatonin 5 MG sublingual tablet sublingual tablet Place 1 tablet under the tongue Every Night. Dual action      montelukast (SINGULAIR) 10 MG tablet Take 1 tablet by mouth Every Night.      ondansetron ODT (ZOFRAN-ODT) 4 MG disintegrating tablet       pantoprazole (PROTONIX) 40 MG EC tablet Take 1 tablet by mouth.      promethazine (PHENERGAN) 25 MG tablet Take 1 tablet by mouth Every 6 (Six) Hours As Needed.      Promethegan 25 MG suppository       vitamin B-12 (CYANOCOBALAMIN) 1000 MCG tablet Take 1 tablet by mouth.       No current facility-administered medications for this visit.         Objective   Vital Signs:   /76   Pulse 86   Ht 172.7 cm (68\")   Wt (!) 138 kg (305 lb)   SpO2 99%   BMI 46.38 kg/m²     Physical Exam  Vitals and nursing note reviewed.   Constitutional:       Appearance: Normal appearance. She is well-developed. She is obese.   Musculoskeletal:         General: Normal range of motion.   Skin:     General: Skin is warm and dry.   Neurological:      General: No focal deficit present.      Mental Status: She is alert and oriented to person, place, and time.   Psychiatric:         Attention and Perception: Attention and perception normal.         Mood and Affect: Affect normal. Mood is anxious.         Speech: Speech normal.         Behavior: Behavior normal. Behavior is " cooperative.         Thought Content: Thought content normal.         Cognition and Memory: Cognition and memory normal.         Judgment: Judgment normal.        Result Review :     The following data was reviewed by: THELMA Hubbard on 02/06/2024:         Assessment and Plan    Diagnoses and all orders for this visit:    1. CRISTINA (generalized anxiety disorder) (Primary)    2. ADHD (attention deficit hyperactivity disorder), inattentive type  -     Urine Drug Screen - Urine, Clean Catch    3. Medication management  -     Urine Drug Screen - Urine, Clean Catch    4. Moderate recurrent major depression    5. Other insomnia    6. Post traumatic stress disorder (PTSD)             Mental Status Exam:   Hygiene:   good  Cooperation:  Cooperative  Eye Contact:  Good  Psychomotor Behavior:  Appropriate  Affect:  Appropriate  Mood: anxious  Speech:  Normal  Thought Process:  Goal directed and Linear  Thought Content:  Normal  Suicidal:  None  Homicidal:  None  Hallucinations:  None  Delusion:  None  Memory:  Intact  Orientation:  Person, Place, Time and Situation  Reliability:  good  Insight:  Good  Judgement:  Good  Impulse Control:  Good  Physical/Medical Issues:  Yes Hidradenitis suppurativa, chronic back pain, endometriosis, and arthritis       PHQ-9 Score:   PHQ-9 Total Score: 17    Impression/Plan:  -This is a follow up and medication check. Alisa reports improved symptoms of depression. She still feels anxious, but she attributes this to making the decision about withdrawing from graduate school for a short time to work on her mental health. She has gained confidence that she is capable of doing the work and she is capable of deciding what is best for her which is a new concept. She is wanting to start therapy during her time off. She is looking forward to having time to focus on her mental health. She is more anxious at night, so we reviewed medications to use if needed. She would like to reduce the  Celexa dose back to 40 mg as she often feels an increased heart rate with anxiety and activity.   -Decrease Celexa to 40 mg daily for depression and anxiety. Patient has refills.  -Continue BuSpar 15 mg 4 times daily for anxiety. Patient will take a dose twice daily scheduled and use 1-2 doses as needed throughout the day for anxiety. Patient has refills.  -Continue Adderall 10 mg daily for ADHD symptoms. Patient has refills.  -Continue clonidine 0.1 mg nightly for anxiety, ADHD symptoms, and sleep. Patient has refills.  -Change lamotrigine to 150 mg twice daily for depression and anxiety.  Patient has refills.  -Continue Atarax 25 mg 3 times daily as needed for anxiety and sleep.   -Continue Vyvanse 70 mg daily for ADHD.   Patient has refills.   -Continue Wellbutrin  mg daily for depression. Patient has refills.  -Continue therapy PRN.  -Last urine drug screen 01/09/2023.  Appropriate.  I ordered a UDS for compliance.  Patient is aware.  -The DESIRAE report, reviewed through PDMP, of the past 12 months were reviewed and is appropriate.  The patient/guardian reports taking the medication only as prescribed.  The patient/guardian denies any abuse or misuse of the medication.  The patient/guardian denies any other substance use or issues.  There are no apparent substance related issues.  The patient reports no side effects of the current medication usage.  The patient/guardian has reported significant improvement with medication usage and wishes to continue medication as prescribed.  The patient/guardian is appropriate to continue with current medication usage at this time.  Reinforced risks and side effects of medication usage, patient and/or guardian verbalize understanding in their own words and are in agreement with current plan.      MEDS ORDERED DURING VISIT:  No orders of the defined types were placed in this encounter.    Follow Up   Return in about 2 months (around 4/6/2024) for Medication  Check.  Patient was given instructions and counseling regarding her condition or for health maintenance advice. Please see specific information pulled into the AVS if appropriate.       TREATMENT PLAN/GOALS: Continue supportive psychotherapy efforts and medications as indicated. Treatment and medication options discussed during today's visit. Patient acknowledged and verbally consented to continue with current treatment plan and was educated on the importance of compliance with treatment and follow-up appointments.    MEDICATION ISSUES:  Discussed medication options and treatment plan of prescribed medication as well as the risks, benefits, and side effects including potential falls, possible impaired driving and metabolic adversities among others. Patient is agreeable to call the office with any worsening of symptoms or onset of side effects. Patient is agreeable to call 911 or go to the nearest ER should he/she begin having SI/HI.        This document has been electronically signed by THELMA Henriquez, PMHNP-BC  February 6, 2024 15:49 EST    Part of this note may be an electronic transcription/translation of spoken language to printed text using the Dragon Dictation System.    This encounter note was scribed for THELMA Hubbard  by Angie Mcgrath, student NP.  I, THELMA Hubbard , personally performed the services described in this documentation as scribed by the above named individual in my presence, and it is both accurate and complete.     04/12/2023  11:55 EDT        [facility] [place] [place] Patient Safety Plan  This Safety Plan Should be printed, and a copy should be placed on a refrigerator, mirror, etc. for easy visibility. A copy should be kept in your billfold or purse and a copy should be on your smart phone for easy retrieval.     2/6/2024   Patient Name: Alisa Saul  YOB: 1995    Alisa participated filling out her safety plan in collaboration with Gisell  Beatris Lai, APRN on 2/6/2024.    Suicide Prevention Hotline:   Call 980 or 1-786.612.4121 or text Talk at 675-592

## 2024-02-06 NOTE — TELEPHONE ENCOUNTER
Patient called in states that she needs refill of Vyvanse and Addderall the pharmacy is out of stock of 10 mg but they have 20 mg and she said she would split them in half. Rx Refill Note  Requested Prescriptions     Pending Prescriptions Disp Refills    lisdexamfetamine (Vyvanse) 70 MG capsule 30 capsule 0     Sig: Take 1 capsule by mouth Every Morning      Last office visit with prescribing clinician: 2/6/2024   Last telemedicine visit with prescribing clinician: 1/9/2024   Next office visit with prescribing clinician: 4/9/2024                         Would you like a call back once the refill request has been completed: [] Yes [] No    If the office needs to give you a call back, can they leave a voicemail: [] Yes [] No    Lindsay Downing CMA  02/06/24, 15:47 EST

## 2024-02-07 LAB
AMPHETAMINES UR QL SCN: POSITIVE NG/ML
BARBITURATES UR QL SCN: NEGATIVE NG/ML
BENZODIAZ UR QL SCN: NEGATIVE NG/ML
BZE UR QL SCN: NEGATIVE NG/ML
CANNABINOIDS UR QL SCN: NEGATIVE NG/ML
CREAT UR-MCNC: 98.6 MG/DL (ref 20–300)
LABORATORY COMMENT REPORT: ABNORMAL
METHADONE UR QL SCN: NEGATIVE NG/ML
OPIATES UR QL SCN: NEGATIVE NG/ML
OXYCODONE+OXYMORPHONE UR QL SCN: NEGATIVE NG/ML
PCP UR QL: NEGATIVE NG/ML
PH UR: 5.6 [PH] (ref 4.5–8.9)
PROPOXYPH UR QL SCN: NEGATIVE NG/ML

## 2024-03-25 DIAGNOSIS — F90.0 ADHD (ATTENTION DEFICIT HYPERACTIVITY DISORDER), INATTENTIVE TYPE: Chronic | ICD-10-CM

## 2024-03-25 RX ORDER — LISDEXAMFETAMINE DIMESYLATE CAPSULES 70 MG/1
70 CAPSULE ORAL EVERY MORNING
Qty: 30 CAPSULE | Refills: 0 | Status: SHIPPED | OUTPATIENT
Start: 2024-03-25

## 2024-04-02 NOTE — PROGRESS NOTES
"This provider is located at [facility] ,[address], [address] in [place], [place],using a secure MyChart Video Visit through [employer]. Patient is being seen remotely via telehealth at their home address in [place], and stated they are in a secure environment for this session. The patient's condition being diagnosed/treated is appropriate for telemedicine. The provider identified herself as well as her credentials.   The patient, and/or patients guardian, consent to be seen remotely, and when consent is given they understand that the consent allows for patient identifiable information to be sent to a third party as needed.   They may refuse to be seen remotely at any time. The electronic data is encrypted and password protected, and the patient and/or guardian has been advised of the potential risks to privacy not withstanding such measures.    Chief Complaint  Depression, anxiety, PTSD, insomnia, and ADHD in the adult      Subjective          Alisa Saul presents via MyChart Video through [employer] by herself for a follow up and medication check.    History of Present Illness: Alisa states, \" things have been okay.\"  Alisa tells me that she has had less good days and more bad days recently.  She tells me that she is having poor self-care, feelings of being numb, and over eating on bad days.  On good days, she is more likely to organize, clean, stay busy, and be more social.  She tells me that there have been situational factors influencing her mood like health concerns of friends and family.  She was hoping to feel improved symptoms of depression with the break from graduate classes.  She tells me that she has also been off her sleep routine and sleep has been \"terrible\" lately.  She notes a constant headache with nausea that is not improving, so she feels this is having some influence over her sleep.  She has tried Zofran which did help her get some consecutive " hours last night.  She endorses passive suicidal ideations but adamantly denies any intent or plan for suicide.  She is starting therapy with a counselor in [place]. She has tried: Mirtazapine, Lexapro, Zoloft, Effexor, BuSpar, Trintellix, and Wellbutrin. Currently, she is taking Clonidine, Adderall, Celexa, Vyvanse, Lamotrigine, Wellbutrin XL and BuSpar.  She denies any side effects.  She denies any SI/HI/AVH.    Current Medications:   Current Outpatient Medications   Medication Sig Dispense Refill    albuterol sulfate  (90 Base) MCG/ACT inhaler Inhale 2 puffs.      Biotin 10 MG capsule Take 10 mg by mouth.      buPROPion XL (WELLBUTRIN XL) 300 MG 24 hr tablet Take 1 tablet by mouth Every Morning. 30 tablet 2    citalopram (CeleXA) 40 MG tablet Take 1 tablet by mouth Daily. 30 tablet 2    clindamycin (CLEOCIN T) 1 % lotion       Humira Pen 40 MG/0.4ML Pen-injector Kit       hydrOXYzine (ATARAX) 25 MG tablet Take 1 tablet by mouth 3 (Three) Times a Day As Needed for Anxiety. 90 tablet 2    lamoTRIgine (LaMICtal) 150 MG tablet TAKE 1 TABLET BY MOUTH TWICE DAILY 60 tablet 2    levocetirizine (XYZAL) 5 MG tablet Take 1 tablet by mouth.      lisdexamfetamine (Vyvanse) 70 MG capsule Take 1 capsule by mouth Every Morning 30 capsule 0    Low-Ogestrel 0.3-30 MG-MCG per tablet TAKE 1 TABLET BY MOUTH 1 TIME EACH DAY.      Magnesium Oxide -Mg Supplement 500 MG tablet Take 1 tablet by mouth Daily.      melatonin 5 MG sublingual tablet sublingual tablet Place 1 tablet under the tongue Every Night. Dual action      montelukast (SINGULAIR) 10 MG tablet Take 1 tablet by mouth Every Night.      ondansetron ODT (ZOFRAN-ODT) 4 MG disintegrating tablet       pantoprazole (PROTONIX) 40 MG EC tablet Take 1 tablet by mouth.      promethazine (PHENERGAN) 25 MG tablet Take 1 tablet by mouth Every 6 (Six) Hours As Needed.      spironolactone (ALDACTONE) 100 MG tablet       tretinoin (RETIN-A) 0.1 % cream APPLY THIN LAYER TOPICALLY  TO FACE AT BEDTIME AS TOLERATED      busPIRone (BUSPAR) 15 MG tablet Take 1 tablet by mouth 4 (Four) Times a Day. (Patient not taking: Reported on 4/9/2024) 120 tablet 2    Promethegan 25 MG suppository  (Patient not taking: Reported on 4/9/2024)      vitamin B-12 (CYANOCOBALAMIN) 1000 MCG tablet Take 1 tablet by mouth. (Patient not taking: Reported on 4/9/2024)       No current facility-administered medications for this visit.         Objective   Vital Signs:   There were no vitals taken for this visit.    Physical Exam  Nursing note reviewed. Vitals reviewed: No vitals to review due to nature of telehealth visit.  Constitutional:       Appearance: Normal appearance. She is well-developed. She is obese.   Neurological:      General: No focal deficit present.      Mental Status: She is alert and oriented to person, place, and time.   Psychiatric:         Attention and Perception: Attention and perception normal.         Mood and Affect: Affect normal. Mood is depressed.         Speech: Speech normal.         Behavior: Behavior normal. Behavior is cooperative.         Thought Content: Thought content includes suicidal (passive ideations without intent or plan) ideation.         Cognition and Memory: Cognition and memory normal.         Judgment: Judgment normal.        Result Review :     The following data was reviewed by: THELMA Hubbard on 04/09/2024:         Assessment and Plan    Diagnoses and all orders for this visit:    1. Severe episode of recurrent major depressive disorder, without psychotic features (Primary)    2. ADHD (attention deficit hyperactivity disorder), inattentive type    3. CRISTINA (generalized anxiety disorder)  -     citalopram (CeleXA) 40 MG tablet; Take 1 tablet by mouth Daily.  Dispense: 30 tablet; Refill: 2    4. Other insomnia    5. Post traumatic stress disorder (PTSD)    6. Moderate recurrent major depression  -     citalopram (CeleXA) 40 MG tablet; Take 1 tablet by mouth  Daily.  Dispense: 30 tablet; Refill: 2  -     buPROPion XL (WELLBUTRIN XL) 300 MG 24 hr tablet; Take 1 tablet by mouth Every Morning.  Dispense: 30 tablet; Refill: 2               Mental Status Exam:   Hygiene:   good  Cooperation:  Cooperative  Eye Contact:  Good  Psychomotor Behavior:  Appropriate  Affect:  Appropriate  Mood: depressed  Speech:  Normal  Thought Process:  Goal directed and Linear  Thought Content:  Normal  Suicidal:  Suicidal Ideation and passive ideations without intent or plan  Homicidal:  None  Hallucinations:  None  Delusion:  None  Memory:  Intact  Orientation:  Person, Place, Time and Situation  Reliability:  good  Insight:  Good  Judgement:  Good  Impulse Control:  Good  Physical/Medical Issues:  Yes Hidradenitis suppurativa, chronic back pain, endometriosis, and arthritis       PHQ-9 Score:   PHQ-9 Total Score: (P) 17    Impression/Plan:  -This is a follow up and medication check. Alisa reports having more bad days than good days recently.  She feels her depression is influenced by health situations of friends and family, as well as poor sleep.  She has been having ongoing headaches with nausea which is interfering with sleep.  Even Zofran has not been much help, but did help last night.  We reviewed possible causes of headaches and she does not attribute it to infrequent use of clonidine, but I did encourage her to monitor her blood pressure.  She tells me that her sleep had been doing well, before the change in mood.  She would like to try to implement sleep hygiene measures.  I expressed my concerns for a possible bipolar disorder activated by the stimulants.  She had been previously taking higher doses while in graduate school, but now is not in school and may be feeling more activated.  She does not deny possible bipolar, but does believe that it is a typical depressive episode for her.  She agrees to stopping the as needed Adderall for some time, but would like to continue Vyvanse  at 70 mg.  I encouraged her to track her mood and sleep until we meet next time to identify any concerns for a possible bipolar disorder.  Otherwise, she is pleased with her current medication regimen and would like to try at current doses for a bit more time before making changes.  -Hold Adderall 10 mg daily for ADHD symptoms. Patient has refills.  -Continue Celexa 40 mg daily for depression and anxiety. Patient has refills.  -Continue BuSpar 15 mg 4 times daily for anxiety. Patient will take a dose twice daily scheduled and use 1-2 doses as needed throughout the day for anxiety. Patient has refills.  -Continue clonidine 0.1 mg nightly for anxiety, ADHD symptoms, and sleep. Patient has refills.  -Change lamotrigine to 150 mg twice daily for depression and anxiety.  Patient has refills.  -Continue Atarax 25 mg 3 times daily as needed for anxiety and sleep.  Patient has refills.  -Continue Vyvanse 70 mg daily for ADHD.   Patient has refills.   -Continue Wellbutrin  mg daily for depression.   -Continue therapy.  -Last urine drug screen 02/06/2024.  Appropriate.   -The DESIRAE report, reviewed through PDMP, of the past 12 months were reviewed and is appropriate.  The patient/guardian reports taking the medication only as prescribed.  The patient/guardian denies any abuse or misuse of the medication.  The patient/guardian denies any other substance use or issues.  There are no apparent substance related issues.  The patient reports no side effects of the current medication usage.  The patient/guardian has reported significant improvement with medication usage and wishes to continue medication as prescribed.  The patient/guardian is appropriate to continue with current medication usage at this time.  Reinforced risks and side effects of medication usage, patient and/or guardian verbalize understanding in their own words and are in agreement with current plan.      MEDS ORDERED DURING VISIT:  New Medications Ordered  This Visit   Medications    citalopram (CeleXA) 40 MG tablet     Sig: Take 1 tablet by mouth Daily.     Dispense:  30 tablet     Refill:  2    buPROPion XL (WELLBUTRIN XL) 300 MG 24 hr tablet     Sig: Take 1 tablet by mouth Every Morning.     Dispense:  30 tablet     Refill:  2     Follow Up   Return in about 2 months (around 6/9/2024) for Medication Check.  Patient was given instructions and counseling regarding her condition or for health maintenance advice. Please see specific information pulled into the AVS if appropriate.       TREATMENT PLAN/GOALS: Continue supportive psychotherapy efforts and medications as indicated. Treatment and medication options discussed during today's visit. Patient acknowledged and verbally consented to continue with current treatment plan and was educated on the importance of compliance with treatment and follow-up appointments.    MEDICATION ISSUES:  Discussed medication options and treatment plan of prescribed medication as well as the risks, benefits, and side effects including potential falls, possible impaired driving and metabolic adversities among others. Patient is agreeable to call the office with any worsening of symptoms or onset of side effects. Patient is agreeable to call 911 or go to the nearest ER should he/she begin having SI/HI.        This document has been electronically signed by THELMA Henriquez, PMHNP-BC  April 10, 2024 13:01 EDT    Part of this note may be an electronic transcription/translation of spoken language to printed text using the Dragon Dictation System.    This encounter note was scribed for THELMA Hubbard  by Angie Mcgrath, student NP.  I, THELMA Hubbard , personally performed the services described in this documentation as scribed by the above named individual in my presence, and it is both accurate and complete.     04/12/2023  11:55 EDT        [facility] [place] Patient Safety Plan  This Safety Plan Should be  printed, and a copy should be placed on a refrigerator, mirror, etc. for easy visibility. A copy should be kept in your billfold or purse and a copy should be on your smart phone for easy retrieval.     4/10/2024   Patient Name: Alisa Saul  YOB: 1995    Alisa participated filling out her safety plan in collaboration with THELMA Hubbard on 4/10/2024.    Suicide Prevention Hotline:   Call 939 or 1-831.851.8289 or text Talk at 277-874

## 2024-04-03 ENCOUNTER — TELEPHONE (OUTPATIENT)
Dept: PSYCHIATRY | Facility: CLINIC | Age: 29
End: 2024-04-03

## 2024-04-03 DIAGNOSIS — F90.0 ADHD (ATTENTION DEFICIT HYPERACTIVITY DISORDER), INATTENTIVE TYPE: Chronic | ICD-10-CM

## 2024-04-03 DIAGNOSIS — F41.1 GAD (GENERALIZED ANXIETY DISORDER): Chronic | ICD-10-CM

## 2024-04-03 DIAGNOSIS — F33.2 SEVERE EPISODE OF RECURRENT MAJOR DEPRESSIVE DISORDER, WITHOUT PSYCHOTIC FEATURES: Chronic | ICD-10-CM

## 2024-04-03 RX ORDER — LAMOTRIGINE 150 MG/1
150 TABLET ORAL 2 TIMES DAILY
Qty: 60 TABLET | Refills: 2 | Status: SHIPPED | OUTPATIENT
Start: 2024-04-03

## 2024-04-03 RX ORDER — LISDEXAMFETAMINE DIMESYLATE CAPSULES 70 MG/1
70 CAPSULE ORAL EVERY MORNING
Qty: 30 CAPSULE | Refills: 0 | Status: SHIPPED | OUTPATIENT
Start: 2024-04-03

## 2024-04-03 NOTE — TELEPHONE ENCOUNTER
Patient called states [facility] does not have the generic Vyvanse in stock [address] [facility] [phone]. The other [facility] in [place] has in stock [address] [facility].    Called [facility] [phone] cancelled the script generic Vyvanse 70 mg sent in on 03/25/2024 by Gisell.  Rx Refill Note  Requested Prescriptions     Pending Prescriptions Disp Refills    lisdexamfetamine (Vyvanse) 70 MG capsule 30 capsule 0     Sig: Take 1 capsule by mouth Every Morning      Last office visit with prescribing clinician: 2/6/2024   Last telemedicine visit with prescribing clinician: 1/9/2024   Next office visit with prescribing clinician: 4/9/2024                         Would you like a call back once the refill request has been completed: [] Yes [] No    If the office needs to give you a call back, can they leave a voicemail: [] Yes [] No    Lindsay Downing CMA  04/03/24, 14:40 EDT

## 2024-04-03 NOTE — TELEPHONE ENCOUNTER
Patient's DESIRAE report reviewed and deemed appropriate.  Patient counseled on use of controlled substances.

## 2024-04-09 ENCOUNTER — TELEMEDICINE (OUTPATIENT)
Dept: PSYCHIATRY | Facility: CLINIC | Age: 29
End: 2024-04-09

## 2024-04-09 DIAGNOSIS — G47.09 OTHER INSOMNIA: Chronic | ICD-10-CM

## 2024-04-09 DIAGNOSIS — F41.1 GAD (GENERALIZED ANXIETY DISORDER): Chronic | ICD-10-CM

## 2024-04-09 DIAGNOSIS — F33.1 MODERATE RECURRENT MAJOR DEPRESSION: Chronic | ICD-10-CM

## 2024-04-09 DIAGNOSIS — F33.2 SEVERE EPISODE OF RECURRENT MAJOR DEPRESSIVE DISORDER, WITHOUT PSYCHOTIC FEATURES: Primary | Chronic | ICD-10-CM

## 2024-04-09 DIAGNOSIS — F90.0 ADHD (ATTENTION DEFICIT HYPERACTIVITY DISORDER), INATTENTIVE TYPE: Chronic | ICD-10-CM

## 2024-04-09 DIAGNOSIS — F43.10 POST TRAUMATIC STRESS DISORDER (PTSD): Chronic | ICD-10-CM

## 2024-04-09 PROCEDURE — 99214 OFFICE O/P EST MOD 30 MIN: CPT | Performed by: NURSE PRACTITIONER

## 2024-04-09 RX ORDER — SPIRONOLACTONE 100 MG/1
TABLET, FILM COATED ORAL
COMMUNITY
Start: 2024-03-08

## 2024-04-09 RX ORDER — BUPROPION HYDROCHLORIDE 300 MG/1
300 TABLET ORAL EVERY MORNING
Qty: 30 TABLET | Refills: 2 | Status: SHIPPED | OUTPATIENT
Start: 2024-04-09

## 2024-04-09 RX ORDER — DEXTROAMPHETAMINE SACCHARATE, AMPHETAMINE ASPARTATE, DEXTROAMPHETAMINE SULFATE AND AMPHETAMINE SULFATE 2.5; 2.5; 2.5; 2.5 MG/1; MG/1; MG/1; MG/1
1 TABLET ORAL DAILY
COMMUNITY
Start: 2024-03-22 | End: 2024-04-09

## 2024-04-09 RX ORDER — CITALOPRAM 40 MG/1
40 TABLET ORAL DAILY
Qty: 30 TABLET | Refills: 2 | Status: SHIPPED | OUTPATIENT
Start: 2024-04-09

## 2024-04-09 RX ORDER — TRETINOIN 1 MG/G
CREAM TOPICAL
COMMUNITY
Start: 2024-04-03

## 2024-04-26 DIAGNOSIS — F41.1 GAD (GENERALIZED ANXIETY DISORDER): Chronic | ICD-10-CM

## 2024-04-26 RX ORDER — BUSPIRONE HYDROCHLORIDE 15 MG/1
15 TABLET ORAL 4 TIMES DAILY
Qty: 360 TABLET | Refills: 1 | Status: SHIPPED | OUTPATIENT
Start: 2024-04-26

## 2024-05-07 DIAGNOSIS — F90.0 ADHD (ATTENTION DEFICIT HYPERACTIVITY DISORDER), INATTENTIVE TYPE: Chronic | ICD-10-CM

## 2024-05-07 RX ORDER — LISDEXAMFETAMINE DIMESYLATE 70 MG/1
70 CAPSULE ORAL EVERY MORNING
Qty: 30 CAPSULE | Refills: 0 | Status: SHIPPED | OUTPATIENT
Start: 2024-05-07

## 2024-06-03 NOTE — PROGRESS NOTES
"This provider is located at [facility], Behavioral Health ,[address], [address] [facility] in [place], [place],using a secure MyChart Video Visit through [employer]. Patient is being seen remotely via telehealth at their home address in [place], and stated they are in a secure environment for this session. The patient's condition being diagnosed/treated is appropriate for telemedicine. The provider identified herself as well as her credentials.   The patient, and/or patients guardian, consent to be seen remotely, and when consent is given they understand that the consent allows for patient identifiable information to be sent to a third party as needed.   They may refuse to be seen remotely at any time. The electronic data is encrypted and password protected, and the patient and/or guardian has been advised of the potential risks to privacy not withstanding such measures.    Chief Complaint  Depression, anxiety, PTSD, insomnia, and ADHD in the adult      Subjective          Alisa Saul presents via MyChart Video through [employer] by herself for a follow up and medication check.    History of Present Illness: Alisa states, \"I am feeling much better since last visit.\" Alisa tells me that she was able to find a therapist in [place] named Amarilis Topete. They are working together twice a week and she is feeling more regulated in her mood or \"neutral\" as she describes. There are still low periods in her mood, but only 2-3 days where the mood was very low. She is trying to stay busy. There are times she feels unmotivated. She is helping her sister move now. This has increased anxiety because she is away from home and in an uncomfortable environment. She has not been sleeping as well because she is doing more activities which push her bedtime later. She has periods of over-eating.  She is considering using Accutane for acne which she has used before and did affect mood, but while taking an oral birth " control too. She has tried: Mirtazapine, Lexapro, Zoloft, Effexor, BuSpar, Trintellix, and Wellbutrin. Currently, she is taking Clonidine, Adderall, Celexa, Vyvanse, Lamotrigine, Wellbutrin XL and BuSpar.  She denies any side effects.  She denies any SI/HI/AVH.    Current Medications:   Current Outpatient Medications   Medication Sig Dispense Refill    albuterol sulfate  (90 Base) MCG/ACT inhaler Inhale 2 puffs.      Biotin 10 MG capsule Take 10 mg by mouth.      buPROPion XL (WELLBUTRIN XL) 300 MG 24 hr tablet Take 1 tablet by mouth Every Morning. 30 tablet 2    busPIRone (BUSPAR) 15 MG tablet TAKE 1 TABLET BY MOUTH FOUR TIMES DAILY 360 tablet 1    citalopram (CeleXA) 40 MG tablet Take 1 tablet by mouth Daily. 30 tablet 2    clindamycin (CLEOCIN T) 1 % lotion       Humira Pen 40 MG/0.4ML Pen-injector Kit       hydrOXYzine (ATARAX) 25 MG tablet Take 1 tablet by mouth 3 (Three) Times a Day As Needed for Anxiety. 90 tablet 2    lamoTRIgine (LaMICtal) 150 MG tablet TAKE 1 TABLET BY MOUTH TWICE DAILY 60 tablet 2    levocetirizine (XYZAL) 5 MG tablet Take 1 tablet by mouth.      lisdexamfetamine (Vyvanse) 70 MG capsule Take 1 capsule by mouth Every Morning 30 capsule 0    Low-Ogestrel 0.3-30 MG-MCG per tablet TAKE 1 TABLET BY MOUTH 1 TIME EACH DAY.      Magnesium Oxide -Mg Supplement 500 MG tablet Take 1 tablet by mouth Daily.      melatonin 5 MG sublingual tablet sublingual tablet Place 1 tablet under the tongue Every Night. Dual action      montelukast (SINGULAIR) 10 MG tablet Take 1 tablet by mouth Every Night.      ondansetron ODT (ZOFRAN-ODT) 4 MG disintegrating tablet       pantoprazole (PROTONIX) 40 MG EC tablet Take 1 tablet by mouth.      spironolactone (ALDACTONE) 100 MG tablet Take 1.5 tablets by mouth Daily.      tretinoin (RETIN-A) 0.1 % cream APPLY THIN LAYER TOPICALLY TO FACE AT BEDTIME AS TOLERATED      promethazine (PHENERGAN) 25 MG tablet Take 1 tablet by mouth Every 6 (Six) Hours As Needed.  (Patient not taking: Reported on 6/6/2024)       No current facility-administered medications for this visit.         Objective   Vital Signs:   There were no vitals taken for this visit.    Physical Exam  Nursing note reviewed. Vitals reviewed: No vitals to review due to nature of telehealth visit.  Constitutional:       Appearance: Normal appearance. She is well-developed. She is obese.   Neurological:      General: No focal deficit present.      Mental Status: She is alert and oriented to person, place, and time.   Psychiatric:         Attention and Perception: Attention and perception normal.         Mood and Affect: Mood and affect normal.         Speech: Speech normal.         Behavior: Behavior normal. Behavior is cooperative.         Thought Content: Thought content normal.         Cognition and Memory: Cognition and memory normal.         Judgment: Judgment normal.        Result Review :     The following data was reviewed by: THELMA Hubbard on 06/06/2024:         Assessment and Plan    Diagnoses and all orders for this visit:    1. Moderate recurrent major depression (Primary)    2. ADHD (attention deficit hyperactivity disorder), inattentive type    3. CRISTINA (generalized anxiety disorder)    4. Other insomnia    5. Post traumatic stress disorder (PTSD)             Mental Status Exam:   Hygiene:   good  Cooperation:  Cooperative  Eye Contact:  Good  Psychomotor Behavior:  Appropriate  Affect:  Appropriate  Mood: normal  Speech:  Normal  Thought Process:  Goal directed and Linear  Thought Content:  Normal  Suicidal:  None  Homicidal:  None  Hallucinations:  None  Delusion:  None  Memory:  Intact  Orientation:  Person, Place, Time and Situation  Reliability:  good  Insight:  Good  Judgement:  Good  Impulse Control:  Good  Physical/Medical Issues:  Yes Hidradenitis suppurativa, chronic back pain, endometriosis, acne, and arthritis       PHQ-9 Score:   PHQ-9 Total Score: (P)  "15    Impression/Plan:  -This is a follow up and medication check. Alisa reports mood stability in mood than last visit. She has less anxiety too; although, it is higher now while helping her sister move. She is seeing a new therapist an attributes the improvement in mood to this and is meeting twice weekly. She feels unmotivated at times. We discussed how individuals living in high states of anxiety or survival mode often feel unproductive, unmotivated, or \"lazy\" when the anxiety quiets. She can see how this may relate to her. She has some down days, but not as severe. She remains focused and concentrating. She can improve sleep if needed. She is pleased with her medication regiment and would like to continue as prescribed.   -Continue Celexa 40 mg daily for depression and anxiety. Patient has refills.  -Continue BuSpar 15 mg 4 times daily for anxiety. Patient will take a dose twice daily scheduled and use 1-2 doses as needed throughout the day for anxiety. Patient has refills.  -Continue clonidine 0.1 mg nightly for anxiety, ADHD symptoms, and sleep. Patient has refills.  -Change lamotrigine to 150 mg twice daily for depression and anxiety.  Patient has refills.  -Continue Atarax 25 mg 3 times daily as needed for anxiety and sleep.  Patient has refills.  -Continue Vyvanse 70 mg daily for ADHD.   Patient has refills.   -Continue Wellbutrin  mg daily for depression. Patient has refills.   -Continue therapy.  -Last urine drug screen 02/06/2024.  Appropriate.   -The [facility], [facility], of the past 12 months were reviewed and is appropriate.  The patient/guardian reports taking the medication only as prescribed.  The patient/guardian denies any abuse or misuse of the medication.  The patient/guardian denies any other substance use or issues.  There are no apparent substance related issues.  The patient reports no side effects of the current medication usage.  The patient/guardian has reported " significant improvement with medication usage and wishes to continue medication as prescribed.  The patient/guardian is appropriate to continue with current medication usage at this time.  Reinforced risks and side effects of medication usage, patient and/or guardian verbalize understanding in their own words and are in agreement with current plan.      MEDS ORDERED DURING VISIT:  No orders of the defined types were placed in this encounter.    Follow Up   Return in about 3 months (around 9/6/2024) for Medication Check.  Patient was given instructions and counseling regarding her condition or for health maintenance advice. Please see specific information pulled into the AVS if appropriate.       TREATMENT PLAN/GOALS: Continue supportive psychotherapy efforts and medications as indicated. Treatment and medication options discussed during today's visit. Patient acknowledged and verbally consented to continue with current treatment plan and was educated on the importance of compliance with treatment and follow-up appointments.    MEDICATION ISSUES:  Discussed medication options and treatment plan of prescribed medication as well as the risks, benefits, and side effects including potential falls, possible impaired driving and metabolic adversities among others. Patient is agreeable to call the office with any worsening of symptoms or onset of side effects. Patient is agreeable to call 911 or go to the nearest ER should he/she begin having SI/HI.        This document has been electronically signed by THELMA Henriquez, PMHNP-BC  June 6, 2024 16:53 EDT    Part of this note may be an electronic transcription/translation of spoken language to printed text using the Dragon Dictation System.    This encounter note was scribed for THELMA Hubbard  by Angie Mcgrath, student NP.  I, THELMA Hubbard , personally performed the services described in this documentation as scribed by the  above named individual in my presence, and it is both accurate and complete.     04/12/2023  11:55 EDT        [facility] Patient Safety Plan  This Safety Plan Should be printed, and a copy should be placed on a refrigerator, mirror, etc. for easy visibility. A copy should be kept in your billfold or purse and a copy should be on your smart phone for easy retrieval.     6/6/2024   Patient Name: Alisa Saul  YOB: 1995    Alisa participated filling out her safety plan in collaboration with THELMA Hubbard on 6/6/2024.    Suicide Prevention Hotline:   Call 861 or 1-272.853.8714 or text Talk at 276-176

## 2024-06-06 ENCOUNTER — TELEMEDICINE (OUTPATIENT)
Dept: PSYCHIATRY | Facility: CLINIC | Age: 29
End: 2024-06-06

## 2024-06-06 DIAGNOSIS — F90.0 ADHD (ATTENTION DEFICIT HYPERACTIVITY DISORDER), INATTENTIVE TYPE: Chronic | ICD-10-CM

## 2024-06-06 DIAGNOSIS — F43.10 POST TRAUMATIC STRESS DISORDER (PTSD): Chronic | ICD-10-CM

## 2024-06-06 DIAGNOSIS — F41.1 GAD (GENERALIZED ANXIETY DISORDER): Chronic | ICD-10-CM

## 2024-06-06 DIAGNOSIS — G47.09 OTHER INSOMNIA: Chronic | ICD-10-CM

## 2024-06-06 DIAGNOSIS — F33.1 MODERATE RECURRENT MAJOR DEPRESSION: Primary | Chronic | ICD-10-CM

## 2024-06-06 PROCEDURE — 99214 OFFICE O/P EST MOD 30 MIN: CPT | Performed by: NURSE PRACTITIONER

## 2024-06-10 DIAGNOSIS — F90.0 ADHD (ATTENTION DEFICIT HYPERACTIVITY DISORDER), INATTENTIVE TYPE: Chronic | ICD-10-CM

## 2024-06-10 RX ORDER — LISDEXAMFETAMINE DIMESYLATE 70 MG/1
70 CAPSULE ORAL EVERY MORNING
Qty: 30 CAPSULE | Refills: 0 | Status: SHIPPED | OUTPATIENT
Start: 2024-06-10

## 2024-07-09 DIAGNOSIS — F90.0 ADHD (ATTENTION DEFICIT HYPERACTIVITY DISORDER), INATTENTIVE TYPE: Chronic | ICD-10-CM

## 2024-07-09 RX ORDER — LISDEXAMFETAMINE DIMESYLATE 70 MG/1
70 CAPSULE ORAL EVERY MORNING
Qty: 30 CAPSULE | Refills: 0 | Status: SHIPPED | OUTPATIENT
Start: 2024-07-09

## 2024-07-17 DIAGNOSIS — F33.2 SEVERE EPISODE OF RECURRENT MAJOR DEPRESSIVE DISORDER, WITHOUT PSYCHOTIC FEATURES: Chronic | ICD-10-CM

## 2024-07-17 DIAGNOSIS — F33.1 MODERATE RECURRENT MAJOR DEPRESSION: Chronic | ICD-10-CM

## 2024-07-17 DIAGNOSIS — F41.1 GAD (GENERALIZED ANXIETY DISORDER): Chronic | ICD-10-CM

## 2024-07-17 RX ORDER — CITALOPRAM 40 MG/1
40 TABLET ORAL DAILY
Qty: 30 TABLET | Refills: 2 | Status: SHIPPED | OUTPATIENT
Start: 2024-07-17

## 2024-07-17 RX ORDER — LAMOTRIGINE 150 MG/1
150 TABLET ORAL 2 TIMES DAILY
Qty: 60 TABLET | Refills: 2 | Status: SHIPPED | OUTPATIENT
Start: 2024-07-17

## 2024-08-16 DIAGNOSIS — F90.0 ADHD (ATTENTION DEFICIT HYPERACTIVITY DISORDER), INATTENTIVE TYPE: Chronic | ICD-10-CM

## 2024-08-16 RX ORDER — LISDEXAMFETAMINE DIMESYLATE 70 MG/1
70 CAPSULE ORAL EVERY MORNING
Qty: 30 CAPSULE | Refills: 0 | Status: SHIPPED | OUTPATIENT
Start: 2024-08-16

## 2024-08-16 NOTE — TELEPHONE ENCOUNTER
Last note reviewed, has follow up scheduled. [place] unable to view in PDMP. Refill sent to pharmacy on file, previously sent on 7/9/24 per EHR.

## 2024-08-21 DIAGNOSIS — F90.0 ADHD (ATTENTION DEFICIT HYPERACTIVITY DISORDER), INATTENTIVE TYPE: Chronic | ICD-10-CM

## 2024-08-21 NOTE — TELEPHONE ENCOUNTER
Pt stated that she needed this resent to a different [facility]. Called and cancelled order at original [facility]. Changed pharmacy in chart.

## 2024-08-22 RX ORDER — LISDEXAMFETAMINE DIMESYLATE 70 MG/1
70 CAPSULE ORAL EVERY MORNING
Qty: 30 CAPSULE | Refills: 0 | Status: SHIPPED | OUTPATIENT
Start: 2024-08-22

## 2024-09-04 NOTE — PROGRESS NOTES
"This provider is located at [facility], Behavioral Health ,[address], [address] in [place], [place],using a secure MyChart Video Visit through [employer]. Patient is being seen remotely via telehealth at their home address in [place], and stated they are in a secure environment for this session. The patient's condition being diagnosed/treated is appropriate for telemedicine. The provider identified herself as well as her credentials.   The patient, and/or patients guardian, consent to be seen remotely, and when consent is given they understand that the consent allows for patient identifiable information to be sent to a third party as needed.   They may refuse to be seen remotely at any time. The electronic data is encrypted and password protected, and the patient and/or guardian has been advised of the potential risks to privacy not withstanding such measures.    Chief Complaint  Depression, anxiety, PTSD, insomnia, and ADHD in the adult      Subjective          Alisa Saul presents via MyChart Video through [employer] by herself for a follow up and medication check.    History of Present Illness: Alisa states, \"I am okay.\" Alisa tells me that she has a lot going on in life. She is still on a temporary leave from graduate school, but following along in course work. She had a recent break up which may have affected her mood, but she finds her depression managed better than before. She is having more anxiety. She cannot identify a cause but reports 2 near panic episodes that occurred suddenly. She was triggered once by an argument with her mother and another time by a fear of being infested by bed bugs or fleas at her home. She reports the panic being hard to control once it started and lasting for some time. She has used Hydroxyzine as needed which helps, but she always fears a sedating effect. She is in therapy and reports doing good work on cognitive distortions and negative thought " patterns. She reports concerns for not getting her Vyvanse on time due to shortages in her area. She is using the IR Adderall to supplement which has helped prevent effects on mood related to withdraw. She is sleeping and eating well. She is tolerating Accutane and it has benefits on acne.   She has tried: Mirtazapine, Lexapro, Zoloft, Effexor, BuSpar, Trintellix, and Wellbutrin. Currently, she is taking Hydroxyzine, Adderall, Celexa, Vyvanse, Lamotrigine, Wellbutrin XL and BuSpar.  She denies any side effects.  She denies any SI/HI/AVH.    Current Medications:   Current Outpatient Medications   Medication Sig Dispense Refill    Adalimumab-adaz 40 MG/0.4ML solution auto-injector 40 mg.      albuterol sulfate  (90 Base) MCG/ACT inhaler Inhale 2 puffs.      Amnesteem 40 MG capsule TAKE 2 CAPSULES BY MOUTH EVERY DAY WITH LARGEST MEAL      Biotin 10 MG capsule Take 10 mg by mouth.      buPROPion XL (WELLBUTRIN XL) 300 MG 24 hr tablet Take 1 tablet by mouth Every Morning. 30 tablet 2    busPIRone (BUSPAR) 15 MG tablet TAKE 1 TABLET BY MOUTH FOUR TIMES DAILY 360 tablet 1    citalopram (CeleXA) 40 MG tablet Take 1 tablet by mouth Daily. 30 tablet 2    hydrOXYzine (ATARAX) 25 MG tablet Take 1 tablet by mouth 3 (Three) Times a Day As Needed for Anxiety. 90 tablet 2    lamoTRIgine (LaMICtal) 150 MG tablet Take 1 tablet by mouth 2 (Two) Times a Day. 60 tablet 2    levocetirizine (XYZAL) 5 MG tablet Take 1 tablet by mouth.      lisdexamfetamine (Vyvanse) 70 MG capsule Take 1 capsule by mouth Every Morning 30 capsule 0    Low-Ogestrel 0.3-30 MG-MCG per tablet TAKE 1 TABLET BY MOUTH 1 TIME EACH DAY.      Magnesium Oxide -Mg Supplement 500 MG tablet Take 1 tablet by mouth Daily.      melatonin 5 MG sublingual tablet sublingual tablet Place 1 tablet under the tongue Every Night. Dual action      montelukast (SINGULAIR) 10 MG tablet Take 1 tablet by mouth Every Night.      ondansetron ODT (ZOFRAN-ODT) 4 MG disintegrating  tablet       promethazine (PHENERGAN) 25 MG tablet Take 1 tablet by mouth Every 6 (Six) Hours As Needed.      spironolactone (ALDACTONE) 100 MG tablet Take 1.5 tablets by mouth Daily.      clindamycin (CLEOCIN T) 1 % lotion  (Patient not taking: Reported on 9/5/2024)      pantoprazole (PROTONIX) 40 MG EC tablet Take 1 tablet by mouth. (Patient not taking: Reported on 9/5/2024)       No current facility-administered medications for this visit.         Objective   Vital Signs:   There were no vitals taken for this visit.    Physical Exam  Nursing note reviewed. Vitals reviewed: No vitals to review due to nature of telehealth visit.  Constitutional:       Appearance: Normal appearance. She is well-developed. She is obese.   Neurological:      General: No focal deficit present.      Mental Status: She is alert and oriented to person, place, and time.   Psychiatric:         Attention and Perception: Attention and perception normal.         Mood and Affect: Mood is anxious. Affect is tearful.         Speech: Speech normal.         Behavior: Behavior normal. Behavior is cooperative.         Thought Content: Thought content normal.         Cognition and Memory: Cognition and memory normal.         Judgment: Judgment normal.        Result Review :     The following data was reviewed by: THELMA Hubbard on 09/05/2024:         Assessment and Plan    Diagnoses and all orders for this visit:    1. CRISTINA (generalized anxiety disorder) (Primary)    2. Moderate recurrent major depression  -     buPROPion XL (WELLBUTRIN XL) 300 MG 24 hr tablet; Take 1 tablet by mouth Every Morning.  Dispense: 30 tablet; Refill: 2    3. ADHD (attention deficit hyperactivity disorder), inattentive type    4. Other insomnia    5. Post traumatic stress disorder (PTSD)               Mental Status Exam:   Hygiene:   good  Cooperation:  Cooperative  Eye Contact:  Good  Psychomotor Behavior:  Appropriate  Affect:   tearful  Mood:  anxious  Speech:  Normal  Thought Process:  Goal directed and Linear  Thought Content:  Normal  Suicidal:  None  Homicidal:  None  Hallucinations:  None  Delusion:  None  Memory:  Intact  Orientation:  Person, Place, Time and Situation  Reliability:  good  Insight:  Good  Judgement:  Good  Impulse Control:  Good  Physical/Medical Issues:  Yes Hidradenitis suppurativa, chronic back pain, endometriosis, acne, and arthritis       PHQ-9 Score:   PHQ-9 Total Score: (P) 14    Impression/Plan:  -This is a follow up and medication check. Alisa reports an improved mood, but worsening anxiety. She is unsure why anxiety is worse, but reports two near panic incidences. She describes her panic as irrational and feeling down on herself that it was beyond her control. I provided a safe space for her to share her thoughts and feelings and challenged the negative thoughts with education about anxiety and the potential for therapy to worsen anxiety temporarily as she works through difficult issues. She is working on identifying negative thought patterns and cognitive distortions. I suggested she learning grounding techniques to manage panic, as well as use Hydroxyzine if needed. She is having trouble getting Vyvanse, so she has been using IR Adderall to supplement which helps keep mood stable and prevent a withdraw effects. We discussed possibly changing Vyvanse, but she is going to give it one more refill before changing since it has managed ADHD symptoms well. For now, we will continue medication as prescribed.   -Continue Celexa 40 mg daily for depression and anxiety. Patient has refills.  -Continue BuSpar 15 mg 4 times daily for anxiety. Patient will take a dose twice daily scheduled and use 1-2 doses as needed throughout the day for anxiety. Patient has refills.  -Continue Hydroxyzine 25 mg three times daily  for anxiety and sleep. Patient has refills.  -Continue lamotrigine 150 mg twice daily for depression and anxiety.   Patient has refills.  -Continue Vyvanse 70 mg daily for ADHD or Adderall 10 mg twice daily if without Vyvanse.   Patient has refills.   -Continue Wellbutrin  mg daily for depression.   -Continue therapy.  -Last urine drug screen 02/06/2024.  Appropriate.   -The DESIRAE report, reviewed through PDMP, of the past 12 months were reviewed and is appropriate.  The patient/guardian reports taking the medication only as prescribed.  The patient/guardian denies any abuse or misuse of the medication.  The patient/guardian denies any other substance use or issues.  There are no apparent substance related issues.  The patient reports no side effects of the current medication usage.  The patient/guardian has reported significant improvement with medication usage and wishes to continue medication as prescribed.  The patient/guardian is appropriate to continue with current medication usage at this time.  Reinforced risks and side effects of medication usage, patient and/or guardian verbalize understanding in their own words and are in agreement with current plan.  -Continue therapy    MEDS ORDERED DURING VISIT:  New Medications Ordered This Visit   Medications    buPROPion XL (WELLBUTRIN XL) 300 MG 24 hr tablet     Sig: Take 1 tablet by mouth Every Morning.     Dispense:  30 tablet     Refill:  2     Follow Up   Return in about 3 months (around 12/5/2024) for Medication Check.  Patient was given instructions and counseling regarding her condition or for health maintenance advice. Please see specific information pulled into the AVS if appropriate.       TREATMENT PLAN/GOALS: Continue supportive psychotherapy efforts and medications as indicated. Treatment and medication options discussed during today's visit. Patient acknowledged and verbally consented to continue with current treatment plan and was educated on the importance of compliance with treatment and follow-up appointments.    MEDICATION ISSUES:  Discussed medication  options and treatment plan of prescribed medication as well as the risks, benefits, and side effects including potential falls, possible impaired driving and metabolic adversities among others. Patient is agreeable to call the office with any worsening of symptoms or onset of side effects. Patient is agreeable to call 911 or go to the nearest ER should he/she begin having SI/HI.        This document has been electronically signed by THELMA Henriquez, PMHNP-BC  September 5, 2024 21:27 EDT    Part of this note may be an electronic transcription/translation of spoken language to printed text using the Dragon Dictation System.    This encounter note was scribed for THELMA Hubbard  by Angie Mcgrath, student NP.  I, THELMA Hubbard , personally performed the services described in this documentation as scribed by the above named individual in my presence, and it is both accurate and complete.     04/12/2023  11:55 EDT        [facility] Patient Safety Plan  This Safety Plan Should be printed, and a copy should be placed on a refrigerator, mirror, etc. for easy visibility. A copy should be kept in your billfold or purse and a copy should be on your smart phone for easy retrieval.     9/5/2024   Patient Name: Alisa Saul  YOB: 1995    Alisa participated filling out her safety plan in collaboration with THELMA Hubbard on 9/5/2024.    Suicide Prevention Hotline:   Call 278 or 1-287.581.8117 or text Talk at 383-164

## 2024-09-05 ENCOUNTER — TELEMEDICINE (OUTPATIENT)
Dept: PSYCHIATRY | Facility: CLINIC | Age: 29
End: 2024-09-05
Payer: COMMERCIAL

## 2024-09-05 DIAGNOSIS — G47.09 OTHER INSOMNIA: Chronic | ICD-10-CM

## 2024-09-05 DIAGNOSIS — F90.0 ADHD (ATTENTION DEFICIT HYPERACTIVITY DISORDER), INATTENTIVE TYPE: Chronic | ICD-10-CM

## 2024-09-05 DIAGNOSIS — F41.1 GAD (GENERALIZED ANXIETY DISORDER): Primary | Chronic | ICD-10-CM

## 2024-09-05 DIAGNOSIS — F43.10 POST TRAUMATIC STRESS DISORDER (PTSD): Chronic | ICD-10-CM

## 2024-09-05 DIAGNOSIS — F33.1 MODERATE RECURRENT MAJOR DEPRESSION: Chronic | ICD-10-CM

## 2024-09-05 PROCEDURE — 99214 OFFICE O/P EST MOD 30 MIN: CPT | Performed by: NURSE PRACTITIONER

## 2024-09-05 RX ORDER — ISOTRETINOIN 40 MG/1
CAPSULE ORAL
COMMUNITY
Start: 2024-08-22

## 2024-09-05 RX ORDER — BUPROPION HYDROCHLORIDE 300 MG/1
300 TABLET ORAL EVERY MORNING
Qty: 30 TABLET | Refills: 2 | Status: SHIPPED | OUTPATIENT
Start: 2024-09-05

## 2024-09-05 RX ORDER — ADALIMUMAB-ADAZ 40 MG/.4ML
40 INJECTION, SOLUTION SUBCUTANEOUS
COMMUNITY
Start: 2024-09-04

## 2024-09-05 RX ORDER — ISOTRETINOIN 40 MG/1
80 CAPSULE ORAL DAILY
COMMUNITY
Start: 2024-07-24 | End: 2024-09-05 | Stop reason: SDUPTHER

## 2024-09-10 ENCOUNTER — TELEPHONE (OUTPATIENT)
Dept: PSYCHIATRY | Facility: CLINIC | Age: 29
End: 2024-09-10
Payer: COMMERCIAL

## 2024-10-14 DIAGNOSIS — F90.0 ADHD (ATTENTION DEFICIT HYPERACTIVITY DISORDER), INATTENTIVE TYPE: Chronic | ICD-10-CM

## 2024-10-14 RX ORDER — LISDEXAMFETAMINE DIMESYLATE 70 MG/1
70 CAPSULE ORAL EVERY MORNING
Qty: 30 CAPSULE | Refills: 0 | OUTPATIENT
Start: 2024-10-14

## 2024-10-15 RX ORDER — LISDEXAMFETAMINE DIMESYLATE 70 MG/1
70 CAPSULE ORAL EVERY MORNING
Qty: 30 CAPSULE | Refills: 0 | Status: SHIPPED | OUTPATIENT
Start: 2024-10-15

## 2024-10-15 NOTE — TELEPHONE ENCOUNTER
That is fine, she actually did not need a visit until December unless she feels she needs to be seen sooner.

## 2024-10-16 ENCOUNTER — TELEPHONE (OUTPATIENT)
Dept: PSYCHIATRY | Facility: CLINIC | Age: 29
End: 2024-10-16
Payer: COMMERCIAL

## 2024-10-16 NOTE — PROGRESS NOTES
"This provider is located at [facility] ,[address], [address] [place] in [place], [place],using a secure MyChart Video Visit through [employer]. Patient is being seen remotely via telehealth at their home address in [place], and stated they are in a secure environment for this session. The patient's condition being diagnosed/treated is appropriate for telemedicine. The provider identified herself as well as her credentials.   The patient, and/or patients guardian, consent to be seen remotely, and when consent is given they understand that the consent allows for patient identifiable information to be sent to a third party as needed.   They may refuse to be seen remotely at any time. The electronic data is encrypted and password protected, and the patient and/or guardian has been advised of the potential risks to privacy not withstanding such measures.    Chief Complaint  Depression, anxiety, PTSD, insomnia, and ADHD in the adult      Subjective          Alisa Saul presents via MyChart Video through [employer] by herself for a follow up and medication check.    History of Present Illness: Alisa states, \"I think since anxiety was a concern last time, I did not notice my depression.\"  Alisa tells me that, since her last visit, she has realized her depression is significant.  The anxiety is better.  She continues in therapy 2 times a week and feels she is making progress and seeing benefits.  She has been practicing her coping skills at home.  However, she cannot overcome the feeling as if she is drowning.  There are times that it is worse and last week was particularly severe.  She states, \"I was in a dark place.\"  She did not find it to be aggravated by a stressor or situations.  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  She had motivation to follow along in the program and keep up with course work, but has seen " a steady decline in motivation and interest over the last few months.  She reports daily depressed mood, anhedonia, difficulty with sleep, fatigue, change in appetite, feelings of guilt, and difficulty concentrating.  She is unsure if the Vyvanse is still providing relief from ADHD symptoms.  She has recently experienced a flare of her autoimmune disorder and had a UTI.  She reports compliance with her psychiatric medication.  She adamantly denies intent or plan for suicide.  She has tried: Mirtazapine, Lexapro, Zoloft, Effexor, BuSpar, Trintellix, and Wellbutrin. Currently, she is taking Hydroxyzine, Adderall, Celexa, Vyvanse, Lamotrigine, Wellbutrin XL and BuSpar.  She denies any side effects.  She denies any SI/HI/AVH.    Current Medications:   Current Outpatient Medications   Medication Sig Dispense Refill    Adalimumab-adaz 40 MG/0.4ML solution auto-injector 40 mg.      albuterol sulfate  (90 Base) MCG/ACT inhaler Inhale 2 puffs.      Amnesteem 40 MG capsule TAKE 2 CAPSULES BY MOUTH EVERY DAY WITH LARGEST MEAL      Biotin 10 MG capsule Take 10 mg by mouth.      buPROPion XL (FORFIVO XL) 450 MG 24 hr tablet Take 1 tablet by mouth Every Morning. 30 tablet 2    busPIRone (BUSPAR) 15 MG tablet TAKE 1 TABLET BY MOUTH FOUR TIMES DAILY 360 tablet 1    citalopram (CeleXA) 40 MG tablet Take 1.5 tablets by mouth Daily. 45 tablet 2    clindamycin (CLEOCIN T) 1 % lotion       FIBER ADULT GUMMIES PO       fluticasone (FLONASE) 50 MCG/ACT nasal spray       hydrOXYzine (ATARAX) 25 MG tablet Take 1 tablet by mouth 3 (Three) Times a Day As Needed for Anxiety. 90 tablet 2    lamoTRIgine (LaMICtal) 150 MG tablet Take 1 tablet by mouth 2 (Two) Times a Day. 60 tablet 2    levocetirizine (XYZAL) 5 MG tablet Take 1 tablet by mouth.      lisdexamfetamine (Vyvanse) 70 MG capsule Take 1 capsule by mouth Every Morning 30 capsule 0    Low-Ogestrel 0.3-30 MG-MCG per tablet TAKE 1 TABLET BY MOUTH 1 TIME EACH DAY.      Magnesium Oxide  -Mg Supplement 500 MG tablet Take 1 tablet by mouth Daily.      melatonin 5 MG sublingual tablet sublingual tablet Place 1 tablet under the tongue Every Night. Dual action      montelukast (SINGULAIR) 10 MG tablet Take 1 tablet by mouth Every Night.      PROBIOTIC PRODUCT PO       spironolactone (ALDACTONE) 100 MG tablet Take 1.5 tablets by mouth Daily.      Cosentyx UnoReady 300 MG/2ML solution auto-injector  (Patient not taking: Reported on 10/17/2024)      hydrOXYzine pamoate (VISTARIL) 25 MG capsule  (Patient not taking: Reported on 10/17/2024)      ondansetron ODT (ZOFRAN-ODT) 4 MG disintegrating tablet  (Patient not taking: Reported on 10/17/2024)      pantoprazole (PROTONIX) 40 MG EC tablet Take 1 tablet by mouth. (Patient not taking: Reported on 10/17/2024)      promethazine (PHENERGAN) 25 MG tablet Take 1 tablet by mouth Every 6 (Six) Hours As Needed. (Patient not taking: Reported on 10/17/2024)      promethazine-dextromethorphan (PROMETHAZINE-DM) 6.25-15 MG/5ML syrup TAKE 5 ML BY MOUTH EVERY 6 HOURS AS NEEDED FOR 4 DAYS (Patient not taking: Reported on 10/17/2024)      sulfamethoxazole-trimethoprim (BACTRIM,SEPTRA) 400-80 MG tablet Take 1 tablet by mouth 2 (Two) Times a Day.       No current facility-administered medications for this visit.         Objective   Vital Signs:   There were no vitals taken for this visit.    Physical Exam  Nursing note reviewed. Vitals reviewed: No vitals to review due to nature of telehealth visit.  Constitutional:       Appearance: Normal appearance. She is well-developed. She is obese.   Neurological:      General: No focal deficit present.      Mental Status: She is alert and oriented to person, place, and time.   Psychiatric:         Attention and Perception: Attention and perception normal.         Mood and Affect: Mood normal. Mood is depressed.         Speech: Speech normal.         Behavior: Behavior normal. Behavior is cooperative.         Thought Content: Thought  content normal.         Cognition and Memory: Cognition and memory normal.         Judgment: Judgment normal.        Result Review :     The following data was reviewed by: THELMA Hubbard on 10/17/2024:         Assessment and Plan    Diagnoses and all orders for this visit:    1. ADHD (attention deficit hyperactivity disorder), inattentive type (Primary)    2. CRISTINA (generalized anxiety disorder)  -     citalopram (CeleXA) 40 MG tablet; Take 1.5 tablets by mouth Daily.  Dispense: 45 tablet; Refill: 2    3. Other insomnia    4. Post traumatic stress disorder (PTSD)    5. Severe episode of recurrent major depressive disorder, without psychotic features    6. Moderate recurrent major depression  -     buPROPion XL (FORFIVO XL) 450 MG 24 hr tablet; Take 1 tablet by mouth Every Morning.  Dispense: 30 tablet; Refill: 2  -     citalopram (CeleXA) 40 MG tablet; Take 1.5 tablets by mouth Daily.  Dispense: 45 tablet; Refill: 2                 Mental Status Exam:   Hygiene:   good  Cooperation:  Cooperative  Eye Contact:  Good  Psychomotor Behavior:  Appropriate  Affect:  Appropriate  Mood: depressed  Speech:  Normal  Thought Process:  Goal directed and Linear  Thought Content:  Normal  Suicidal:  None  Homicidal:  None  Hallucinations:  None  Delusion:  None  Memory:  Intact  Orientation:  Person, Place, Time and Situation  Reliability:  good  Insight:  Good  Judgement:  Good  Impulse Control:  Good  Physical/Medical Issues:  Yes Hidradenitis suppurativa, chronic back pain, endometriosis, acne, and arthritis       PHQ-9 Score:   PHQ-9 Total Score:      Impression/Plan:  -This is a follow up and medication check. Alisa reports a worsening of depression.  She does not find this to be situational, but a gradual effect.  Last week was severe.  She adamantly denies suicidal ideations, intent, or plan.  She is motivated to make medication adjustments.  She continues in therapy as well.  She finds she is making  progress in therapy.  Therefore, we reviewed her medications and discussed possible options.  We discussed increasing Celexa and Wellbutrin versus changing lamotrigine to a medication like lithium or an atypical antipsychotic like Vraylar.  Due to the risk of those medications, she is motivated to adjust Celexa and Wellbutrin first.  I provided reassurance about the benefits of medications like lithium and Vraylar if needed.  She agrees to following up in 4 weeks to assess benefits of medication changes.  She is aware to present to the nearest emergency room [facility] suicidal ideations present.  -Increase Celexa to 60 mg daily for depression and anxiety.   -Increase Wellbutrin XL to 450 mg daily for depression.   -Continue BuSpar 15 mg 4 times daily for anxiety. Patient will take a dose twice daily scheduled and use 1-2 doses as needed throughout the day for anxiety. Patient has refills.  -Continue Hydroxyzine 25 mg three times daily  for anxiety and sleep. Patient has refills.  -Continue lamotrigine 150 mg twice daily for depression and anxiety.  Patient has refills.  -Continue Vyvanse 70 mg daily for ADHD or Adderall 10 mg twice daily if without Vyvanse.   Patient has refills.   -Continue therapy.  -Last urine drug screen 02/06/2024.  Appropriate.   -The DESIRAE report, reviewed through PDMP, of the past 12 months were reviewed and is appropriate.  The patient/guardian reports taking the medication only as prescribed.  The patient/guardian denies any abuse or misuse of the medication.  The patient/guardian denies any other substance use or issues.  There are no apparent substance related issues.  The patient reports no side effects of the current medication usage.  The patient/guardian has reported significant improvement with medication usage and wishes to continue medication as prescribed.  The patient/guardian is appropriate to continue with current medication usage at this time.  Reinforced risks and side effects  of medication usage, patient and/or guardian verbalize understanding in their own words and are in agreement with current plan.  -Continue therapy    MEDS ORDERED DURING VISIT:  New Medications Ordered This Visit   Medications    buPROPion XL (FORFIVO XL) 450 MG 24 hr tablet     Sig: Take 1 tablet by mouth Every Morning.     Dispense:  30 tablet     Refill:  2    citalopram (CeleXA) 40 MG tablet     Sig: Take 1.5 tablets by mouth Daily.     Dispense:  45 tablet     Refill:  2     Follow Up   Return in about 4 weeks (around 11/14/2024) for Medication Check.  Patient was given instructions and counseling regarding her condition or for health maintenance advice. Please see specific information pulled into the AVS if appropriate.       TREATMENT PLAN/GOALS: Continue supportive psychotherapy efforts and medications as indicated. Treatment and medication options discussed during today's visit. Patient acknowledged and verbally consented to continue with current treatment plan and was educated on the importance of compliance with treatment and follow-up appointments.    MEDICATION ISSUES:  Discussed medication options and treatment plan of prescribed medication as well as the risks, benefits, and side effects including potential falls, possible impaired driving and metabolic adversities among others. Patient is agreeable to call the office with any worsening of symptoms or onset of side effects. Patient is agreeable to call 911 or go to the nearest ER should he/she begin having SI/HI.        This document has been electronically signed by THELMA Henriquez, PMHNP-BC  October 17, 2024 17:20 EDT    Part of this note may be an electronic transcription/translation of spoken language to printed text using the Dragon Dictation System.    This encounter note was scribed for THELMA Hubbard  by Angie Mcgrath, student NP.  I, THELMA Hubbard , personally performed the services described in  this documentation as scribed by the above named individual in my presence, and it is both accurate and complete.     04/12/2023  11:55 EDT        [facility] Patient Safety Plan  This Safety Plan Should be printed, and a copy should be placed on a refrigerator, mirror, etc. for easy visibility. A copy should be kept in your billfold or purse and a copy should be on your smart phone for easy retrieval.     10/17/2024   Patient Name: Alisa Saul  YOB: 1995    Alisa participated filling out her safety plan in collaboration with THELMA Hubbard on 10/17/2024.    Suicide Prevention Hotline:   Call 818 or 1-377.277.6643 or text Talk at 970-095

## 2024-10-16 NOTE — TELEPHONE ENCOUNTER
Patient called in left a message requesting a sooner appointment she said its not a crisis but that she is really struggling with her depression and thinks she needs a medication change.  Please advise

## 2024-10-16 NOTE — TELEPHONE ENCOUNTER
Called Patient back she said she is seeing therapist 2x a week and they suggested she call due to how down and hopeless she was feeling. She said nothing has really changed she said at appointment anxiety was heightened and was masking the depression anxiety is somewhat under control and she realized depression had been there all the time. She is requesting provider call her or work her in sooner to talk to provider before any med adjustments or changes take place.

## 2024-10-16 NOTE — TELEPHONE ENCOUNTER
Has she talked to her therapist about the depression worsening? What medication does she feel has not been working?

## 2024-10-16 NOTE — TELEPHONE ENCOUNTER
I have a 9 or 10 AM spot for the patient tomorrow; however, she will have to be in [place] to be seen. If tomorrow is not possible, I will have to look ahead and find some other options. Would someone from front office mind to call patient and discuss?

## 2024-10-17 ENCOUNTER — TELEMEDICINE (OUTPATIENT)
Dept: PSYCHIATRY | Facility: CLINIC | Age: 29
End: 2024-10-17

## 2024-10-17 DIAGNOSIS — F41.1 GAD (GENERALIZED ANXIETY DISORDER): Chronic | ICD-10-CM

## 2024-10-17 DIAGNOSIS — F33.1 MODERATE RECURRENT MAJOR DEPRESSION: Chronic | ICD-10-CM

## 2024-10-17 DIAGNOSIS — G47.09 OTHER INSOMNIA: Chronic | ICD-10-CM

## 2024-10-17 DIAGNOSIS — F43.10 POST TRAUMATIC STRESS DISORDER (PTSD): Chronic | ICD-10-CM

## 2024-10-17 DIAGNOSIS — F90.0 ADHD (ATTENTION DEFICIT HYPERACTIVITY DISORDER), INATTENTIVE TYPE: Primary | Chronic | ICD-10-CM

## 2024-10-17 DIAGNOSIS — F33.2 SEVERE EPISODE OF RECURRENT MAJOR DEPRESSIVE DISORDER, WITHOUT PSYCHOTIC FEATURES: Chronic | ICD-10-CM

## 2024-10-17 PROCEDURE — 99214 OFFICE O/P EST MOD 30 MIN: CPT | Performed by: NURSE PRACTITIONER

## 2024-10-17 RX ORDER — DEXTROMETHORPHAN HYDROBROMIDE AND PROMETHAZINE HYDROCHLORIDE 15; 6.25 MG/5ML; MG/5ML
SYRUP ORAL
COMMUNITY
Start: 2024-09-30

## 2024-10-17 RX ORDER — CITALOPRAM HYDROBROMIDE 40 MG/1
60 TABLET ORAL DAILY
Qty: 45 TABLET | Refills: 2 | Status: SHIPPED | OUTPATIENT
Start: 2024-10-17

## 2024-10-17 RX ORDER — SECUKINUMAB 300 MG/2ML
INJECTION SUBCUTANEOUS
COMMUNITY
Start: 2024-10-15

## 2024-10-17 RX ORDER — FLUTICASONE PROPIONATE 50 UG/1
SPRAY, METERED NASAL
COMMUNITY

## 2024-10-17 RX ORDER — BUPROPION HYDROCHLORIDE 450 MG/1
450 TABLET, FILM COATED, EXTENDED RELEASE ORAL EVERY MORNING
Qty: 30 TABLET | Refills: 2 | Status: SHIPPED | OUTPATIENT
Start: 2024-10-17

## 2024-10-17 RX ORDER — SULFAMETHOXAZOLE AND TRIMETHOPRIM 400; 80 MG/1; MG/1
1 TABLET ORAL 2 TIMES DAILY
COMMUNITY

## 2024-10-17 RX ORDER — HYDROXYZINE PAMOATE 25 MG/1
CAPSULE ORAL
COMMUNITY

## 2024-10-22 ENCOUNTER — TELEPHONE (OUTPATIENT)
Dept: PSYCHIATRY | Facility: CLINIC | Age: 29
End: 2024-10-22
Payer: COMMERCIAL

## 2024-10-22 DIAGNOSIS — F33.1 MODERATE RECURRENT MAJOR DEPRESSION: Chronic | ICD-10-CM

## 2024-10-22 NOTE — TELEPHONE ENCOUNTER
Patient has been approved and a copay card was sent to her to help with cost. She will call back if this does not work.

## 2024-10-22 NOTE — TELEPHONE ENCOUNTER
Patient insurance is requiring a prior authorization for the Bupropion  mg. Pa has been sent to insurance Patient is aware we will contact her once determination has been made and sent back to office.

## 2024-10-28 RX ORDER — BUPROPION HYDROCHLORIDE 300 MG/1
300 TABLET ORAL EVERY MORNING
Qty: 30 TABLET | Refills: 2 | Status: SHIPPED | OUTPATIENT
Start: 2024-10-28 | End: 2025-10-28

## 2024-10-28 RX ORDER — BUPROPION HYDROCHLORIDE 150 MG/1
150 TABLET ORAL DAILY
Qty: 30 TABLET | Refills: 2 | Status: SHIPPED | OUTPATIENT
Start: 2024-10-28

## 2024-10-28 NOTE — TELEPHONE ENCOUNTER
With savings card the cost is still $70.00 for the Wellbutrin  mg she said this is more than she can afford. Please advise

## 2024-10-28 NOTE — TELEPHONE ENCOUNTER
We can try to use the 300 and 150 mg dose and see if insurance will allow. Let me know and I will try that.

## 2024-10-28 NOTE — TELEPHONE ENCOUNTER
Patient is agreeable to try the two doses to see if cost is cheaper. Please send to [facility] [phone]

## 2024-10-30 DIAGNOSIS — F33.2 SEVERE EPISODE OF RECURRENT MAJOR DEPRESSIVE DISORDER, WITHOUT PSYCHOTIC FEATURES: Chronic | ICD-10-CM

## 2024-10-30 DIAGNOSIS — F41.1 GAD (GENERALIZED ANXIETY DISORDER): Chronic | ICD-10-CM

## 2024-10-30 RX ORDER — LAMOTRIGINE 150 MG/1
150 TABLET ORAL 2 TIMES DAILY
Qty: 60 TABLET | Refills: 2 | Status: SHIPPED | OUTPATIENT
Start: 2024-10-30

## 2024-10-30 NOTE — TELEPHONE ENCOUNTER
Last visit: 10/17/24   Next visit: 11/13/24 -Pt cancelled    Pt needs a follow up appointment scheduled. Provider to advise if refill is appropriate.

## 2024-11-12 NOTE — PROGRESS NOTES
"This provider is located at [facility], Behavioral [facility] ,[address], [address] in [place], [place],using a secure MyChart Video Visit through [employer]. Patient is being seen remotely via telehealth at their home address in [place], and stated they are in a secure environment for this session. The patient's condition being diagnosed/treated is appropriate for telemedicine. The provider identified herself as well as her credentials.   The patient, and/or patients guardian, consent to be seen remotely, and when consent is given they understand that the consent allows for patient identifiable information to be sent to a third party as needed.   They may refuse to be seen remotely at any time. The electronic data is encrypted and password protected, and the patient and/or guardian has been advised of the potential risks to privacy not withstanding such measures.    Mode of Visit: Video   Location of patient: -OTHER-: mother's home    Location of provider: [facility]   You have chosen to receive care through a telehealth visit.   The patient has signed the video visit consent form.   The visit included audio and video interaction. No technical issues occurred during this visit.     Chief Complaint  Depression, anxiety, PTSD, insomnia, and ADHD in the adult      Subjective          Alisa Saul presents via MyChart Video through [employer] by herself for a follow up and medication check.    History of Present Illness: Alisa states, \"my mood is improved overall.\" Alisa tells me that her depression has improved, but there are still times of a depressed mood. She may experience a lack of motivation, impaired sleep, psychomotor retardation, and depressed mood. The improved depression has helped her anxiety. She is still working with her therapist to help with remaining anxiety and working on coping skills. She finds her worry spirals and can be difficult to control. She is using Hydroxyzine " or Melatonin if needed. She is feeling better when she sleeps a little less and wakes earlier. She struggles most with falling asleep. As her depression improves, she is feeling more motivated to have groceries to prepare food and eat better. She denies any medical changes. She has tried: Mirtazapine, Lexapro, Zoloft, Effexor, BuSpar, Trintellix, and Wellbutrin. Currently, she is taking Hydroxyzine, Adderall, Celexa, Vyvanse, Lamotrigine, Wellbutrin XL and BuSpar.  She denies any side effects.  She denies any SI/HI/AVH.    Current Medications:   Current Outpatient Medications   Medication Sig Dispense Refill    albuterol sulfate  (90 Base) MCG/ACT inhaler Inhale 2 puffs.      Biotin 10 MG capsule Take 10 mg by mouth.      buPROPion XL (Wellbutrin XL) 150 MG 24 hr tablet Take 1 tablet by mouth Daily. Take in addition to 150 mg 30 tablet 2    buPROPion XL (Wellbutrin XL) 300 MG 24 hr tablet Take 1 tablet by mouth Every Morning. Take in addition to the 150 mg dose 30 tablet 2    busPIRone (BUSPAR) 15 MG tablet TAKE 1 TABLET BY MOUTH FOUR TIMES DAILY 360 tablet 1    citalopram (CeleXA) 40 MG tablet Take 1.5 tablets by mouth Daily. 45 tablet 2    clindamycin (CLEOCIN T) 1 % lotion       Cosentyx UnoReady 300 MG/2ML solution auto-injector       FIBER ADULT GUMMIES PO       fluticasone (FLONASE) 50 MCG/ACT nasal spray       hydrOXYzine (ATARAX) 25 MG tablet Take 1 tablet by mouth 3 (Three) Times a Day As Needed for Anxiety. 90 tablet 2    hydrOXYzine pamoate (VISTARIL) 25 MG capsule       ISOtretinoin (ACCUTANE) 40 MG capsule Take 2 capsules by mouth Daily.      lamoTRIgine (LaMICtal) 150 MG tablet Take 1 tablet by mouth 2 (Two) Times a Day. 60 tablet 2    levocetirizine (XYZAL) 5 MG tablet Take 1 tablet by mouth.      lisdexamfetamine (Vyvanse) 70 MG capsule Take 1 capsule by mouth Every Morning 30 capsule 0    loratadine (Claritin) 10 MG tablet Take 1 tablet by mouth Daily.      Low-Ogestrel 0.3-30 MG-MCG per  tablet TAKE 1 TABLET BY MOUTH 1 TIME EACH DAY.      Magnesium Oxide -Mg Supplement 500 MG tablet Take 1 tablet by mouth Daily.      melatonin 5 MG sublingual tablet sublingual tablet Place 1 tablet under the tongue Every Night. Dual action      montelukast (SINGULAIR) 10 MG tablet Take 1 tablet by mouth Every Night.      pantoprazole (PROTONIX) 40 MG EC tablet Take 1 tablet by mouth.      PROBIOTIC PRODUCT PO       spironolactone (ALDACTONE) 100 MG tablet Take 1.5 tablets by mouth Daily.      vitamin D (ERGOCALCIFEROL) 1.25 MG (35513 UT) capsule capsule Take 1 capsule by mouth 1 (One) Time Per Week.      Zinc 100 MG tablet Take  by mouth.       No current facility-administered medications for this visit.         Objective   Vital Signs:   There were no vitals taken for this visit.    Physical Exam  Nursing note reviewed. Vitals reviewed: No vitals to review due to nature of telehealth visit.  Constitutional:       Appearance: Normal appearance. She is well-developed. She is obese.   Neurological:      General: No focal deficit present.      Mental Status: She is alert and oriented to person, place, and time.   Psychiatric:         Attention and Perception: Attention and perception normal.         Mood and Affect: Affect normal. Mood is anxious.         Speech: Speech normal.         Behavior: Behavior normal. Behavior is cooperative.         Thought Content: Thought content normal.         Cognition and Memory: Cognition and memory normal.         Judgment: Judgment normal.        Result Review :     The following data was reviewed by: THELMA Hubbard on 11/18/2024:         Assessment and Plan    Diagnoses and all orders for this visit:    1. Moderate recurrent major depression (Primary)  -     CBC & Differential; Future  -     Comprehensive Metabolic Panel; Future  -     TSH; Future  -     T4, Free; Future    2. ADHD (attention deficit hyperactivity disorder), inattentive type    3. CRISTINA (generalized  anxiety disorder)    4. Other insomnia    5. Post traumatic stress disorder (PTSD)          Mental Status Exam:   Hygiene:   good  Cooperation:  Cooperative  Eye Contact:  Good  Psychomotor Behavior:  Appropriate  Affect:  Appropriate  Mood: anxious  Speech:  Normal  Thought Process:  Goal directed and Linear  Thought Content:  Normal  Suicidal:  None  Homicidal:  None  Hallucinations:  None  Delusion:  None  Memory:  Intact  Orientation:  Person, Place, Time and Situation  Reliability:  good  Insight:  Good  Judgement:  Good  Impulse Control:  Good  Physical/Medical Issues:  Yes Hidradenitis suppurativa, chronic back pain, endometriosis, acne, and arthritis       PHQ-9 Score:   PHQ-9 Total Score: (Patient-Rptd) 16       Impression/Plan:  -This is a follow up and medication check. Alisa reports some improvement in depression which helps anxiety. Her symptoms have not resolved and she remains motivated to make medication adjustments to improve her mood overall before starting graduate school again in January. She is working with her therapist. She is sleeping with medications and her appetite has been better. We discussed trying either Lithium or Vraylar to augment her current medications. I explained the mechanism of action, purpose, risks, benefits, and potential adverse effects. She is aware Lithium would require pre-labs and lab monitoring as well as being aware of signs of toxicity. She is unsure if she is willing to accept risks of Vraylar, so she is considering Lithium. She agrees to getting labs and we will touch base after results to discuss Lithium.   -Continue Wellbutrin  mg daily for depression. Patient has refills.  -Continue Celexa 60 mg daily for depression and anxiety. Patient has refills.  -Continue BuSpar 15 mg 4 times daily for anxiety. Patient will take a dose twice daily scheduled and use 1-2 doses as needed throughout the day for anxiety. Patient has refills.  -Continue Hydroxyzine 25 mg  three times daily  for anxiety and sleep. Patient has refills.  -Continue lamotrigine 150 mg twice daily for depression and anxiety.  Patient has refills.  -Continue Vyvanse 70 mg daily for ADHD or Adderall 10 mg twice daily if without Vyvanse.   Patient has refills.   -Last urine drug screen 02/06/2024.  Appropriate.   -The DESIRAE report, reviewed through PDMP, of the past 12 months were reviewed and is appropriate.  The patient/guardian reports taking the medication only as prescribed.  The patient/guardian denies any abuse or misuse of the medication.  The patient/guardian denies any other substance use or issues.  There are no apparent substance related issues.  The patient reports no side effects of the current medication usage.  The patient/guardian has reported significant improvement with medication usage and wishes to continue medication as prescribed.  The patient/guardian is appropriate to continue with current medication usage at this time.  Reinforced risks and side effects of medication usage, patient and/or guardian verbalize understanding in their own words and are in agreement with current plan.  -Continue therapy  -Collect CBC, CMP, TSH, and T4. Patient is aware to be fasting.   MEDS ORDERED DURING VISIT:  No orders of the defined types were placed in this encounter.    Follow Up   Return in about 2 months (around 1/18/2025) for Medication Check.  Patient was given instructions and counseling regarding her condition or for health maintenance advice. Please see specific information pulled into the AVS if appropriate.       TREATMENT PLAN/GOALS: Continue supportive psychotherapy efforts and medications as indicated. Treatment and medication options discussed during today's visit. Patient acknowledged and verbally consented to continue with current treatment plan and was educated on the importance of compliance with treatment and follow-up appointments.    MEDICATION ISSUES:  Discussed medication  options and treatment plan of prescribed medication as well as the risks, benefits, and side effects including potential falls, possible impaired driving and metabolic adversities among others. Patient is agreeable to call the office with any worsening of symptoms or onset of side effects. Patient is agreeable to call 911 or go to the nearest ER should he/she begin having SI/HI.        This document has been electronically signed by THELMA Henriquez, PMHNP-BC  November 18, 2024 18:33 EST    Part of this note may be an electronic transcription/translation of spoken language to printed text using the Dragon Dictation System.    This encounter note was scribed for THELMA Hubbard  by Angie Mcgrath, student NP.  I, THELMA Hubbard , personally performed the services described in this documentation as scribed by the above named individual in my presence, and it is both accurate and complete.     04/12/2023  11:55 EDT        [facility] Patient Safety Plan  This Safety Plan Should be printed, and a copy should be placed on a refrigerator, mirror, etc. for easy visibility. A copy should be kept in your billfold or purse and a copy should be on your smart phone for easy retrieval.     11/18/2024   Patient Name: Alisa Saul  YOB: 1995    Alisa participated filling out her safety plan in collaboration with THELMA Hubbard on 11/18/2024.    Suicide Prevention Hotline:   Call 725 or 1-271.212.3323 or text Talk at 247-487

## 2024-11-14 DIAGNOSIS — F90.0 ADHD (ATTENTION DEFICIT HYPERACTIVITY DISORDER), INATTENTIVE TYPE: Chronic | ICD-10-CM

## 2024-11-14 RX ORDER — LISDEXAMFETAMINE DIMESYLATE 70 MG/1
70 CAPSULE ORAL EVERY MORNING
Qty: 30 CAPSULE | Refills: 0 | Status: SHIPPED | OUTPATIENT
Start: 2024-11-14

## 2024-11-18 ENCOUNTER — TELEMEDICINE (OUTPATIENT)
Dept: PSYCHIATRY | Facility: CLINIC | Age: 29
End: 2024-11-18
Payer: COMMERCIAL

## 2024-11-18 DIAGNOSIS — F33.1 MODERATE RECURRENT MAJOR DEPRESSION: Primary | Chronic | ICD-10-CM

## 2024-11-18 DIAGNOSIS — F90.0 ADHD (ATTENTION DEFICIT HYPERACTIVITY DISORDER), INATTENTIVE TYPE: Chronic | ICD-10-CM

## 2024-11-18 DIAGNOSIS — F41.1 GAD (GENERALIZED ANXIETY DISORDER): Chronic | ICD-10-CM

## 2024-11-18 DIAGNOSIS — G47.09 OTHER INSOMNIA: Chronic | ICD-10-CM

## 2024-11-18 DIAGNOSIS — F43.10 POST TRAUMATIC STRESS DISORDER (PTSD): Chronic | ICD-10-CM

## 2024-11-18 PROCEDURE — 99214 OFFICE O/P EST MOD 30 MIN: CPT | Performed by: NURSE PRACTITIONER

## 2024-11-18 RX ORDER — B-COMPLEX WITH VITAMIN C
TABLET ORAL
COMMUNITY

## 2024-11-18 RX ORDER — ISOTRETINOIN 40 MG/1
80 CAPSULE ORAL DAILY
COMMUNITY

## 2024-11-18 RX ORDER — LORATADINE 10 MG/1
10 TABLET ORAL DAILY
COMMUNITY

## 2024-11-18 RX ORDER — ERGOCALCIFEROL 1.25 MG/1
50000 CAPSULE, LIQUID FILLED ORAL WEEKLY
COMMUNITY

## 2024-11-27 ENCOUNTER — LAB (OUTPATIENT)
Dept: LAB | Facility: HOSPITAL | Age: 29
End: 2024-11-27
Payer: COMMERCIAL

## 2024-11-27 DIAGNOSIS — F33.1 MODERATE RECURRENT MAJOR DEPRESSION: ICD-10-CM

## 2024-11-27 LAB
ALBUMIN SERPL-MCNC: 4.2 G/DL (ref 3.5–5.2)
ALBUMIN/GLOB SERPL: 1.2 G/DL
ALP SERPL-CCNC: 70 U/L (ref 39–117)
ALT SERPL W P-5'-P-CCNC: 17 U/L (ref 1–33)
ANION GAP SERPL CALCULATED.3IONS-SCNC: 10 MMOL/L (ref 5–15)
AST SERPL-CCNC: 19 U/L (ref 1–32)
BASOPHILS # BLD AUTO: 0.04 10*3/MM3 (ref 0–0.2)
BASOPHILS NFR BLD AUTO: 0.4 % (ref 0–1.5)
BILIRUB SERPL-MCNC: <0.2 MG/DL (ref 0–1.2)
BUN SERPL-MCNC: 11 MG/DL (ref 6–20)
BUN/CREAT SERPL: 12.8 (ref 7–25)
CALCIUM SPEC-SCNC: 9.8 MG/DL (ref 8.6–10.5)
CHLORIDE SERPL-SCNC: 103 MMOL/L (ref 98–107)
CO2 SERPL-SCNC: 24 MMOL/L (ref 22–29)
CREAT SERPL-MCNC: 0.86 MG/DL (ref 0.57–1)
DEPRECATED RDW RBC AUTO: 40 FL (ref 37–54)
EGFRCR SERPLBLD CKD-EPI 2021: 94.5 ML/MIN/1.73
EOSINOPHIL # BLD AUTO: 0.24 10*3/MM3 (ref 0–0.4)
EOSINOPHIL NFR BLD AUTO: 2.4 % (ref 0.3–6.2)
ERYTHROCYTE [DISTWIDTH] IN BLOOD BY AUTOMATED COUNT: 12.2 % (ref 12.3–15.4)
GLOBULIN UR ELPH-MCNC: 3.4 GM/DL
GLUCOSE SERPL-MCNC: 75 MG/DL (ref 65–99)
HCT VFR BLD AUTO: 41.8 % (ref 34–46.6)
HGB BLD-MCNC: 14.3 G/DL (ref 12–15.9)
IMM GRANULOCYTES # BLD AUTO: 0.05 10*3/MM3 (ref 0–0.05)
IMM GRANULOCYTES NFR BLD AUTO: 0.5 % (ref 0–0.5)
LYMPHOCYTES # BLD AUTO: 3.36 10*3/MM3 (ref 0.7–3.1)
LYMPHOCYTES NFR BLD AUTO: 33.9 % (ref 19.6–45.3)
MCH RBC QN AUTO: 31.1 PG (ref 26.6–33)
MCHC RBC AUTO-ENTMCNC: 34.2 G/DL (ref 31.5–35.7)
MCV RBC AUTO: 90.9 FL (ref 79–97)
MONOCYTES # BLD AUTO: 0.82 10*3/MM3 (ref 0.1–0.9)
MONOCYTES NFR BLD AUTO: 8.3 % (ref 5–12)
NEUTROPHILS NFR BLD AUTO: 5.39 10*3/MM3 (ref 1.7–7)
NEUTROPHILS NFR BLD AUTO: 54.5 % (ref 42.7–76)
NRBC BLD AUTO-RTO: 0 /100 WBC (ref 0–0.2)
PLATELET # BLD AUTO: 375 10*3/MM3 (ref 140–450)
PMV BLD AUTO: 10.4 FL (ref 6–12)
POTASSIUM SERPL-SCNC: 4.3 MMOL/L (ref 3.5–5.2)
PROT SERPL-MCNC: 7.6 G/DL (ref 6–8.5)
RBC # BLD AUTO: 4.6 10*6/MM3 (ref 3.77–5.28)
SODIUM SERPL-SCNC: 137 MMOL/L (ref 136–145)
T4 FREE SERPL-MCNC: 1.11 NG/DL (ref 0.92–1.68)
TSH SERPL DL<=0.05 MIU/L-ACNC: 1.47 UIU/ML (ref 0.27–4.2)
WBC NRBC COR # BLD AUTO: 9.9 10*3/MM3 (ref 3.4–10.8)

## 2024-11-27 PROCEDURE — 36415 COLL VENOUS BLD VENIPUNCTURE: CPT

## 2024-11-27 PROCEDURE — 84443 ASSAY THYROID STIM HORMONE: CPT

## 2024-11-27 PROCEDURE — 80053 COMPREHEN METABOLIC PANEL: CPT

## 2024-11-27 PROCEDURE — 84439 ASSAY OF FREE THYROXINE: CPT

## 2024-11-27 PROCEDURE — 85025 COMPLETE CBC W/AUTO DIFF WBC: CPT

## 2024-12-17 ENCOUNTER — TELEPHONE (OUTPATIENT)
Dept: PSYCHIATRY | Facility: CLINIC | Age: 29
End: 2024-12-17
Payer: COMMERCIAL

## 2024-12-17 NOTE — TELEPHONE ENCOUNTER
Pt called and stated that she would like to go a different route than what was discussed at her appointment. Would like to know options available for her with medications.

## 2024-12-18 DIAGNOSIS — F90.0 ADHD (ATTENTION DEFICIT HYPERACTIVITY DISORDER), INATTENTIVE TYPE: Primary | ICD-10-CM

## 2024-12-18 DIAGNOSIS — F33.2 SEVERE EPISODE OF RECURRENT MAJOR DEPRESSIVE DISORDER, WITHOUT PSYCHOTIC FEATURES: ICD-10-CM

## 2024-12-18 RX ORDER — DEXTROAMPHETAMINE SACCHARATE, AMPHETAMINE ASPARTATE MONOHYDRATE, DEXTROAMPHETAMINE SULFATE, AMPHETAMINE SULFATE 3.125; 3.125; 3.125; 3.125 MG/1; MG/1; MG/1; MG/1
12.5 CAPSULE, EXTENDED RELEASE ORAL DAILY
Qty: 30 CAPSULE | Refills: 0 | Status: SHIPPED | OUTPATIENT
Start: 2024-12-18

## 2024-12-19 DIAGNOSIS — F33.1 MODERATE RECURRENT MAJOR DEPRESSION: Chronic | ICD-10-CM

## 2024-12-19 RX ORDER — BUPROPION HYDROCHLORIDE 300 MG/1
300 TABLET ORAL EVERY MORNING
Qty: 30 TABLET | Refills: 2 | Status: SHIPPED | OUTPATIENT
Start: 2024-12-19

## 2025-01-09 ENCOUNTER — TELEMEDICINE (OUTPATIENT)
Dept: PSYCHIATRY | Facility: CLINIC | Age: 30
End: 2025-01-09
Payer: COMMERCIAL

## 2025-01-09 DIAGNOSIS — F90.0 ADHD (ATTENTION DEFICIT HYPERACTIVITY DISORDER), INATTENTIVE TYPE: Chronic | ICD-10-CM

## 2025-01-09 DIAGNOSIS — G47.09 OTHER INSOMNIA: Chronic | ICD-10-CM

## 2025-01-09 DIAGNOSIS — F41.1 GAD (GENERALIZED ANXIETY DISORDER): Chronic | ICD-10-CM

## 2025-01-09 DIAGNOSIS — F43.10 POST TRAUMATIC STRESS DISORDER (PTSD): Chronic | ICD-10-CM

## 2025-01-09 DIAGNOSIS — F33.1 MODERATE RECURRENT MAJOR DEPRESSION: Primary | Chronic | ICD-10-CM

## 2025-01-09 PROCEDURE — 99214 OFFICE O/P EST MOD 30 MIN: CPT | Performed by: NURSE PRACTITIONER

## 2025-01-09 NOTE — PROGRESS NOTES
"This provider is located at [facility], Behavioral Health ,[address], [address] in [place], [place],using a secure [employer] Video Visit through [employer]. Patient is being seen remotely via telehealth at their mother's home address in [place], and stated they are in a secure environment for this session. The patient's condition being diagnosed/treated is appropriate for telemedicine. The provider identified herself as well as her credentials.   The patient, and/or patients guardian, consent to be seen remotely, and when consent is given they understand that the consent allows for patient identifiable information to be sent to a third party as needed.   They may refuse to be seen remotely at any time. The electronic data is encrypted and password protected, and the patient and/or guardian has been advised of the potential risks to privacy not withstanding such measures.    Mode of Visit: Video   Location of patient: -OTHER-: mother's home    Location of provider: [facility]   You have chosen to receive care through a telehealth visit.   The patient has signed the video visit consent form.   The visit included audio and video interaction. No technical issues occurred during this visit.     Chief Complaint  Depression, anxiety, PTSD, insomnia, and ADHD in the adult      Subjective          Alisa Saul presents via MyChart Video through [employer] by herself for a follow up and medication check.    History of Present Illness: Alisa states, \"I didn't get to start the medication changes right away.\" Alisa reports starting Vraylar on 12/27/24 and Mydayis last Friday on 1/3/25. She is having some issues, but is also seeing some benefits. She was feeling overly sleepy with Vraylar before starting Mydayis, so she tried changing the timing of administration and did not see any major changes in daytime, but sleep worsened. She is back to taking daily. She continues to feel fatigue but has seen " this lessen when she started Mydayis. The dose is not providing full symptoms resolution of executive dysfunction, so she will be motivated to continue adjusting dose upward. Her graduate classes are starting new week. She is feeling anxious. She is seeing some improvements on depressive episodes. She reports today being a good day for mood. She is trying to maintain a routine for bedtime and reminding herself that worrying about sleep may worsen her anxiety. She uses Hydroxyzine when needed. She denies any changes to appetite.  She has tried: Mirtazapine, Lexapro, Zoloft, Effexor, BuSpar, Trintellix, and Wellbutrin. Currently, she is taking Vraylar, Mydayis, Hydroxyzine, Celexa, Lamotrigine, Wellbutrin XL and BuSpar.  She denies any side effects.  She denies any SI/HI/AVH.    Current Medications:   Current Outpatient Medications   Medication Sig Dispense Refill    albuterol sulfate  (90 Base) MCG/ACT inhaler Inhale 2 puffs.      Amphet-Dextroamphet 3-Bead ER 12.5 MG capsule sustained-release 24 hr Take 12.5 mg by mouth Daily. 30 capsule 0    Biotin 10 MG capsule Take 10 mg by mouth.      buPROPion XL (WELLBUTRIN XL) 300 MG 24 hr tablet TAKE 1 TABLET BY MOUTH EVERY MORNING 30 tablet 2    busPIRone (BUSPAR) 15 MG tablet TAKE 1 TABLET BY MOUTH FOUR TIMES DAILY 360 tablet 1    Cariprazine HCl (VRAYLAR) 3 MG capsule capsule Take 1 capsule by mouth Daily. 30 capsule 1    citalopram (CeleXA) 40 MG tablet Take 1.5 tablets by mouth Daily. 45 tablet 2    clindamycin (CLEOCIN T) 1 % lotion       Cosentyx UnoReady 300 MG/2ML solution auto-injector       FIBER ADULT GUMMIES PO       fluticasone (FLONASE) 50 MCG/ACT nasal spray       hydrOXYzine (ATARAX) 25 MG tablet Take 1 tablet by mouth 3 (Three) Times a Day As Needed for Anxiety. 90 tablet 2    hydrOXYzine pamoate (VISTARIL) 25 MG capsule       ISOtretinoin (ACCUTANE) 40 MG capsule Take 2 capsules by mouth Daily.      lamoTRIgine (LaMICtal) 150 MG tablet Take 1 tablet  by mouth 2 (Two) Times a Day. 60 tablet 2    levocetirizine (XYZAL) 5 MG tablet Take 1 tablet by mouth.      loratadine (Claritin) 10 MG tablet Take 1 tablet by mouth Daily.      Low-Ogestrel 0.3-30 MG-MCG per tablet TAKE 1 TABLET BY MOUTH 1 TIME EACH DAY.      Magnesium Oxide -Mg Supplement 500 MG tablet Take 1 tablet by mouth Daily.      melatonin 5 MG sublingual tablet sublingual tablet Place 1 tablet under the tongue Every Night. Dual action      montelukast (SINGULAIR) 10 MG tablet Take 1 tablet by mouth Every Night.      pantoprazole (PROTONIX) 40 MG EC tablet Take 1 tablet by mouth.      PROBIOTIC PRODUCT PO       spironolactone (ALDACTONE) 100 MG tablet Take 1.5 tablets by mouth Daily.      vitamin D (ERGOCALCIFEROL) 1.25 MG (63093 UT) capsule capsule Take 1 capsule by mouth 1 (One) Time Per Week.      Zinc 100 MG tablet Take  by mouth.       No current facility-administered medications for this visit.         Objective   Vital Signs:   There were no vitals taken for this visit.    Physical Exam  Nursing note reviewed. Vitals reviewed: No vitals to review due to nature of telehealth visit.  Constitutional:       Appearance: Normal appearance. She is well-developed. She is obese.   Neurological:      General: No focal deficit present.      Mental Status: She is alert and oriented to person, place, and time.   Psychiatric:         Attention and Perception: Attention and perception normal.         Mood and Affect: Mood and affect normal.         Speech: Speech normal.         Behavior: Behavior normal. Behavior is cooperative.         Thought Content: Thought content normal.         Cognition and Memory: Cognition and memory normal.         Judgment: Judgment normal.        Result Review :     The following data was reviewed by: THELMA Hubbard on 01/09/2025:         Assessment and Plan    Diagnoses and all orders for this visit:    1. Moderate recurrent major depression (Primary)    2. ADHD  (attention deficit hyperactivity disorder), inattentive type    3. CRISTINA (generalized anxiety disorder)    4. Other insomnia    5. Post traumatic stress disorder (PTSD)            Mental Status Exam:   Hygiene:   good  Cooperation:  Cooperative  Eye Contact:  Good  Psychomotor Behavior:  Appropriate  Affect:  Appropriate  Mood: normal  Speech:  Normal  Thought Process:  Goal directed and Linear  Thought Content:  Normal  Suicidal:  None  Homicidal:  None  Hallucinations:  None  Delusion:  None  Memory:  Intact  Orientation:  Person, Place, Time and Situation  Reliability:  good  Insight:  Good  Judgement:  Good  Impulse Control:  Good  Physical/Medical Issues:  Yes Hidradenitis suppurativa, chronic back pain, endometriosis, acne, and arthritis       PHQ-9 Score:   PHQ-9 Total Score: (Patient-Rptd) 15          Impression/Plan:  -This is a follow up and medication check. Alisa reports seeing some changes with the initiation of Vraylar and Mydayis. She has noticed a great deal of fatigue with Vraylar during the day. Switching to night worsened her sleep, so she is back to taking in the morning. Mydayis is helping this some, but the low dose of 12.5 mg is not enough for ADHD symptom management. She is motivated to make medication adjustments since school is starting next week. She has also been implementing good sleep hygiene measures and healthy lifestyle adjustments for success in school. I suggested decreasing Wellbutrin dose as this may be the source of overwhelming fatigue with Vraylar. I will continue to adjust dose of Mydayis upwards every two weeks for benefits. She agrees to these changes.   -Stop Vyvanse and Adderall since patient is no longer taking.  -At next refill, increase Mydayis to 25 mg daily for ADHD symptoms.   -Decrase Wellbutrin XL to 300 mg daily for depression. Patient has refills of 300 mg.  -Continue Celexa 60 mg daily for depression and anxiety. Patient has refills.  -Continue BuSpar 15 mg 4  times daily for anxiety. Patient will take a dose twice daily scheduled and use 1-2 doses as needed throughout the day for anxiety. Patient has refills.  -Continue Hydroxyzine 25 mg three times daily  for anxiety and sleep. Patient has refills.  -Continue lamotrigine 150 mg twice daily for depression and anxiety.  Patient has refills.  -Last urine drug screen 02/06/2024.  Appropriate.   -The DESIRAE report, reviewed through PDMP, of the past 12 months were reviewed and is appropriate.  The patient/guardian reports taking the medication only as prescribed.  The patient/guardian denies any abuse or misuse of the medication.  The patient/guardian denies any other substance use or issues.  There are no apparent substance related issues.  The patient reports no side effects of the current medication usage.  The patient/guardian has reported significant improvement with medication usage and wishes to continue medication as prescribed.  The patient/guardian is appropriate to continue with current medication usage at this time.  Reinforced risks and side effects of medication usage, patient and/or guardian verbalize understanding in their own words and are in agreement with current plan.  -Continue therapy  -Collect CBC, CMP, TSH, and T4. Patient is aware to be fasting.     MEDS ORDERED DURING VISIT:  No orders of the defined types were placed in this encounter.    Follow Up   Return in about 2 months (around 3/9/2025) for Medication Check.  Patient was given instructions and counseling regarding her condition or for health maintenance advice. Please see specific information pulled into the AVS if appropriate.       TREATMENT PLAN/GOALS: Continue supportive psychotherapy efforts and medications as indicated. Treatment and medication options discussed during today's visit. Patient acknowledged and verbally consented to continue with current treatment plan and was educated on the importance of compliance with treatment and  follow-up appointments.    MEDICATION ISSUES:  Discussed medication options and treatment plan of prescribed medication as well as the risks, benefits, and side effects including potential falls, possible impaired driving and metabolic adversities among others. Patient is agreeable to call the office with any worsening of symptoms or onset of side effects. Patient is agreeable to call 911 or go to the nearest ER should he/she begin having SI/HI.        This document has been electronically signed by THELMA Henriquez, PMHNP-BC  January 10, 2025 17:12 EST    Part of this note may be an electronic transcription/translation of spoken language to printed text using the Dragon Dictation System.    This encounter note was scribed for THELMA Hubbard  by Angie Mcgrath, student NP.  I, THELMA Hubbard , personally performed the services described in this documentation as scribed by the above named individual in my presence, and it is both accurate and complete.     04/12/2023  11:55 EDT        [facility] Patient Safety Plan  This Safety Plan Should be printed, and a copy should be placed on a refrigerator, mirror, etc. for easy visibility. A copy should be kept in your billfold or purse and a copy should be on your smart phone for easy retrieval.     1/10/2025   Patient Name: Alisa Saul  YOB: 1995    Alisa participated filling out her safety plan in collaboration with THELMA Hubbard on 1/10/2025.    Suicide Prevention Hotline:   Call 486 or 1-302.935.8707 or text Talk at 497-244

## 2025-01-13 DIAGNOSIS — F90.0 ADHD (ATTENTION DEFICIT HYPERACTIVITY DISORDER), INATTENTIVE TYPE: Primary | ICD-10-CM

## 2025-01-13 DIAGNOSIS — F41.1 GAD (GENERALIZED ANXIETY DISORDER): Chronic | ICD-10-CM

## 2025-01-13 DIAGNOSIS — F33.2 SEVERE EPISODE OF RECURRENT MAJOR DEPRESSIVE DISORDER, WITHOUT PSYCHOTIC FEATURES: Chronic | ICD-10-CM

## 2025-01-13 DIAGNOSIS — F33.1 MODERATE RECURRENT MAJOR DEPRESSION: Chronic | ICD-10-CM

## 2025-01-13 RX ORDER — DEXTROAMPHETAMINE SACCHARATE, AMPHETAMINE ASPARTATE MONOHYDRATE, DEXTROAMPHETAMINE SULFATE, AMPHETAMINE SULFATE 6.25; 6.25; 6.25; 6.25 MG/1; MG/1; MG/1; MG/1
25 CAPSULE, EXTENDED RELEASE ORAL DAILY
Qty: 14 CAPSULE | Refills: 0 | Status: SHIPPED | OUTPATIENT
Start: 2025-01-13

## 2025-01-13 RX ORDER — LAMOTRIGINE 150 MG/1
150 TABLET ORAL 2 TIMES DAILY
Qty: 60 TABLET | Refills: 2 | Status: SHIPPED | OUTPATIENT
Start: 2025-01-13

## 2025-01-13 RX ORDER — CITALOPRAM HYDROBROMIDE 40 MG/1
40 TABLET ORAL DAILY
Qty: 30 TABLET | Refills: 2 | Status: SHIPPED | OUTPATIENT
Start: 2025-01-13

## 2025-01-13 RX ORDER — CITALOPRAM HYDROBROMIDE 40 MG/1
60 TABLET ORAL DAILY
Qty: 45 TABLET | Refills: 2 | Status: SHIPPED | OUTPATIENT
Start: 2025-01-13 | End: 2025-01-13 | Stop reason: SDUPTHER

## 2025-02-02 DIAGNOSIS — F90.0 ADHD (ATTENTION DEFICIT HYPERACTIVITY DISORDER), INATTENTIVE TYPE: ICD-10-CM

## 2025-02-02 RX ORDER — DEXTROAMPHETAMINE SACCHARATE, AMPHETAMINE ASPARTATE MONOHYDRATE, DEXTROAMPHETAMINE SULFATE, AMPHETAMINE SULFATE 6.25; 6.25; 6.25; 6.25 MG/1; MG/1; MG/1; MG/1
25 CAPSULE, EXTENDED RELEASE ORAL DAILY
Qty: 14 CAPSULE | Refills: 0 | Status: CANCELLED | OUTPATIENT
Start: 2025-02-02

## 2025-02-03 DIAGNOSIS — F90.0 ADHD (ATTENTION DEFICIT HYPERACTIVITY DISORDER), INATTENTIVE TYPE: ICD-10-CM

## 2025-02-03 DIAGNOSIS — F33.2 SEVERE EPISODE OF RECURRENT MAJOR DEPRESSIVE DISORDER, WITHOUT PSYCHOTIC FEATURES: ICD-10-CM

## 2025-02-03 RX ORDER — DEXTROAMPHETAMINE SACCHARATE, AMPHETAMINE ASPARTATE MONOHYDRATE, DEXTROAMPHETAMINE SULFATE, AMPHETAMINE SULFATE 6.25; 6.25; 6.25; 6.25 MG/1; MG/1; MG/1; MG/1
25 CAPSULE, EXTENDED RELEASE ORAL DAILY
Qty: 30 CAPSULE | Refills: 0 | Status: SHIPPED | OUTPATIENT
Start: 2025-02-03

## 2025-02-03 NOTE — TELEPHONE ENCOUNTER
sent in script. Patient has been made aware starting next month script will have to be sent somewhere in [place] for compliance.

## 2025-02-03 NOTE — TELEPHONE ENCOUNTER
Let me know when we hear from this patient. If I need to refill, I can. I just want to make sure she is doing well. Thanks!

## 2025-02-03 NOTE — TELEPHONE ENCOUNTER
Tried to call Patient had to leave a message requesting call back.    Rx Refill Note  Requested Prescriptions     Pending Prescriptions Disp Refills    Amphet-Dextroamphet 3-Bead ER 25 MG capsule sustained-release 24 hr 14 capsule 0     Sig: Take 1 capsule by mouth Daily.      Last office visit with prescribing clinician: 2/6/2024   Last telemedicine visit with prescribing clinician: 1/9/2025   Next office visit with prescribing clinician: 3/13/2025                         Would you like a call back once the refill request has been completed: [] Yes [] No    If the office needs to give you a call back, can they leave a voicemail: [] Yes [] No    Lindsay Downing, GRICELDA  02/03/25, 09:56 EST

## 2025-02-03 NOTE — TELEPHONE ENCOUNTER
After speaking with another provider in the office, it is best if we let this patients provider address this due to her being out of [place] and not being able to pull anything up on her DESIRAE report. I have never saw this patient so it complicates this as well.

## 2025-02-04 DIAGNOSIS — F33.1 MODERATE RECURRENT MAJOR DEPRESSION: Chronic | ICD-10-CM

## 2025-02-04 RX ORDER — BUPROPION HYDROCHLORIDE 150 MG/1
150 TABLET ORAL DAILY
Qty: 30 TABLET | Refills: 2 | OUTPATIENT
Start: 2025-02-04

## 2025-02-04 RX ORDER — BUPROPION HYDROCHLORIDE 300 MG/1
300 TABLET ORAL EVERY MORNING
Qty: 30 TABLET | Refills: 2 | OUTPATIENT
Start: 2025-02-04

## 2025-02-23 DIAGNOSIS — F41.1 GAD (GENERALIZED ANXIETY DISORDER): Chronic | ICD-10-CM

## 2025-02-23 DIAGNOSIS — F33.1 MODERATE RECURRENT MAJOR DEPRESSION: Chronic | ICD-10-CM

## 2025-02-24 RX ORDER — LAMOTRIGINE 150 MG/1
150 TABLET ORAL 2 TIMES DAILY
Qty: 60 TABLET | Refills: 2 | Status: SHIPPED | OUTPATIENT
Start: 2025-02-24

## 2025-03-05 NOTE — PROGRESS NOTES
"Mode of Visit: Video   Location of patient: -OTHER-: mother's home    Location of provider: +HOME+   You have chosen to receive care through a telehealth visit.   The patient has signed the video visit consent form.   The visit included audio and video interaction. No technical issues occurred during this visit.     Chief Complaint  Depression, anxiety, PTSD, insomnia, and ADHD in the adult      Subjective          Alisa Saul presents via [employer] through [employer] by herself for a follow up and medication check.    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 She found that she was overwhelmed with apathy which she was unsure was from depression or medication. She bean to worry that her mental health was not going to allow her to continue on with the program, so she elected to resign. She admits to some relief after making the decision, but struggles more with guilt, shame, and doubt now. She is working with her therapist who provided helpful insight that \"it didn't have to be a right decision, but a decision made right.\" She has found that helpful to cling to when feeling down. She is job searching and feels a sense of pressure from her mother to gain employment. She thinks she may take any job and continue searching in the event it is not the one for her. She elected to stop Vraylar because she was feeling \"like a zombie\" after taking. She tapered herself down three weeks ago and took her last dose 3-4 days ago. She is starting to see the benefits of stopping. She notes ongoing anxiety and panic. She is suing Buspar and Hydroxyzine if needed. This is helping. She reports improved symptoms of executive dysfunction with Mydayis, but does believe she may need a higher dose to manage all symptoms for work. She is sleeping and eating. She continues in " therapy.  She has tried: Vraylar, Mirtazapine, Lexapro, Zoloft, Effexor, BuSpar, Trintellix, and Wellbutrin. Currently, she is taking Mydayis, Hydroxyzine, Celexa, Lamotrigine, Wellbutrin XL and BuSpar.  She denies any side effects.  She denies any SI/HI/AVH.    Current Medications:   Current Outpatient Medications   Medication Sig Dispense Refill    albuterol sulfate  (90 Base) MCG/ACT inhaler Inhale 2 puffs.      buPROPion XL (WELLBUTRIN XL) 300 MG 24 hr tablet TAKE 1 TABLET BY MOUTH EVERY MORNING 30 tablet 2    busPIRone (BUSPAR) 15 MG tablet TAKE 1 TABLET BY MOUTH FOUR TIMES DAILY 360 tablet 1    citalopram (CeleXA) 40 MG tablet Take 1 tablet by mouth Daily. 30 tablet 2    clindamycin (CLEOCIN T) 1 % lotion       Cosentyx UnoReady 300 MG/2ML solution auto-injector       FIBER ADULT GUMMIES PO       fluticasone (FLONASE) 50 MCG/ACT nasal spray       hydrOXYzine (ATARAX) 25 MG tablet Take 1 tablet by mouth 3 (Three) Times a Day As Needed for Anxiety. 90 tablet 2    hydrOXYzine pamoate (VISTARIL) 25 MG capsule       ISOtretinoin (ACCUTANE) 40 MG capsule Take 2 capsules by mouth Daily.      lamoTRIgine (LaMICtal) 150 MG tablet Take 1 tablet by mouth 2 (Two) Times a Day. 60 tablet 2    loratadine (Claritin) 10 MG tablet Take 1 tablet by mouth Daily.      Low-Ogestrel 0.3-30 MG-MCG per tablet TAKE 1 TABLET BY MOUTH 1 TIME EACH DAY.      melatonin 5 MG sublingual tablet sublingual tablet Place 1 tablet under the tongue Every Night. Dual action      montelukast (SINGULAIR) 10 MG tablet Take 1 tablet by mouth Every Night.      pantoprazole (PROTONIX) 40 MG EC tablet Take 1 tablet by mouth.      PROBIOTIC PRODUCT PO       spironolactone (ALDACTONE) 100 MG tablet Take 1.5 tablets by mouth Daily.      vitamin D (ERGOCALCIFEROL) 1.25 MG (98149 UT) capsule capsule Take 1 capsule by mouth 1 (One) Time Per Week.      Zinc 100 MG tablet Take  by mouth.      Amphet-Dextroamphet 3-Bead ER 37.5 MG capsule sustained-release  24 hr Take 1 capsule by mouth Daily. 30 capsule 0    Biotin 10 MG capsule Take 10 mg by mouth.      Magnesium Oxide -Mg Supplement 500 MG tablet Take 1 tablet by mouth Daily.       No current facility-administered medications for this visit.         Objective   Vital Signs:   There were no vitals taken for this visit.    Physical Exam  Nursing note reviewed. Vitals reviewed: No vitals to review due to nature of telehealth visit.  Constitutional:       Appearance: Normal appearance. She is well-developed. She is obese.   Neurological:      General: No focal deficit present.      Mental Status: She is alert and oriented to person, place, and time.   Psychiatric:         Attention and Perception: Attention and perception normal.         Mood and Affect: Affect normal. Mood is depressed. Affect is tearful.         Speech: Speech normal.         Behavior: Behavior normal. Behavior is cooperative.         Thought Content: Thought content normal. Thought content includes suicidal (passive without intent or plan) ideation.         Cognition and Memory: Cognition and memory normal.         Judgment: Judgment normal.        Result Review :     The following data was reviewed by: THELMA Hubbard on 03/06/2025:         Assessment and Plan    Diagnoses and all orders for this visit:    1. Moderate recurrent major depression (Primary)    2. ADHD (attention deficit hyperactivity disorder), inattentive type  -     Amphet-Dextroamphet 3-Bead ER 37.5 MG capsule sustained-release 24 hr; Take 1 capsule by mouth Daily.  Dispense: 30 capsule; Refill: 0  -     Urine Drug Screen - Urine, Clean Catch; Future    3. Medication management  -     Urine Drug Screen - Urine, Clean Catch; Future    4. CRISTINA (generalized anxiety disorder)    5. Other insomnia    6. Post traumatic stress disorder (PTSD)              Mental Status Exam:   Hygiene:   good  Cooperation:  Cooperative  Eye Contact:  Good  Psychomotor Behavior:   Appropriate  Affect:   tearful  Mood: depressed  Speech:  Normal  Thought Process:  Goal directed and Linear  Thought Content:  Normal  Suicidal:  None  Homicidal:  None  Hallucinations:  None  Delusion:  None  Memory:  Intact  Orientation:  Person, Place, Time and Situation  Reliability:  good  Insight:  Good  Judgement:  Good  Impulse Control:  Good  Physical/Medical Issues:  Yes Hidradenitis suppurativa, chronic back pain, endometriosis, acne, and arthritis       PHQ-9 Score:   PHQ-9 Total Score: (Patient-Rptd) 22      Impression/Plan:  -This is a follow up and medication check. Alisa reports ongoing symptoms of severe depression. She elected to quit nurse anesthesia school and has a mixture of feelings from relief to guilt/shame/doubt. She is feeling pressure to find employment from mother and this has led to arguing when she feels parented as an adult. She notes panic. She is using as needed medications for this and reports benefits. She elected to stop Vraylar for apathy and difficulty with sleep. She is seeing the benefits of stopping and would like to stay off if possible. She has seen the benefits from changing to Mydayis as well, but believes she need s higher dose to resolve executive dysfunction remaining when she works. She is sleeping better and appetite is good. We agreed to continue with titration of Mydayis and stay off Vraylar. She is working with her therapist. I provided support and validation for her thoughts and feelings and provided encouragement for her to make choices that are helpful for her. She agrees and we decided to meet back in two months to assess.   -Stop Vraylar due to perceived adverse effects.  -Increase Mydayis to 37.5 mg daily for ADHD symptoms.   -Continue Wellbutrin  mg daily for depression. Patient has refills of 300 mg.  -Continue Celexa 40 mg daily for depression and anxiety. Patient has refills.  -Continue BuSpar 15 mg 4 times daily for anxiety. Patient will take  a dose twice daily scheduled and use 1-2 doses as needed throughout the day for anxiety. Patient has refills.  -Continue Hydroxyzine 25 mg three times daily  for anxiety and sleep. Patient has refills.  -Continue lamotrigine 150 mg twice daily for depression and anxiety.  Patient has refills.  -Last urine drug screen 02/06/2024.  Appropriate. I will complete UDS for compliance.   -The DESIRAE report, reviewed through PDMP, of the past 12 months were reviewed and is appropriate.  The patient/guardian reports taking the medication only as prescribed.  The patient/guardian denies any abuse or misuse of the medication.  The patient/guardian denies any other substance use or issues.  There are no apparent substance related issues.  The patient reports no side effects of the current medication usage.  The patient/guardian has reported significant improvement with medication usage and wishes to continue medication as prescribed.  The patient/guardian is appropriate to continue with current medication usage at this time.  Reinforced risks and side effects of medication usage, patient and/or guardian verbalize understanding in their own words and are in agreement with current plan.  -Continue therapy  -Reviewed CBC, CMP, TSH, and T4 on 11/27/24.    MEDS ORDERED DURING VISIT:  New Medications Ordered This Visit   Medications    Amphet-Dextroamphet 3-Bead ER 37.5 MG capsule sustained-release 24 hr     Sig: Take 1 capsule by mouth Daily.     Dispense:  30 capsule     Refill:  0     Follow Up   Return in about 2 months (around 5/6/2025) for Medication Check.  Patient was given instructions and counseling regarding her condition or for health maintenance advice. Please see specific information pulled into the AVS if appropriate.       TREATMENT PLAN/GOALS: Continue supportive psychotherapy efforts and medications as indicated. Treatment and medication options discussed during today's visit. Patient acknowledged and verbally  consented to continue with current treatment plan and was educated on the importance of compliance with treatment and follow-up appointments.    MEDICATION ISSUES:  Discussed medication options and treatment plan of prescribed medication as well as the risks, benefits, and side effects including potential falls, possible impaired driving and metabolic adversities among others. Patient is agreeable to call the office with any worsening of symptoms or onset of side effects. Patient is agreeable to call 911 or go to the nearest ER should he/she begin having SI/HI.        This document has been electronically signed by THELMA Henriquez, PMHNP-BC  March 6, 2025 12:23 EST    Part of this note may be an electronic transcription/translation of spoken language to printed text using the Dragon Dictation System.    This encounter note was scribed for THELMA Hubbard  by Angie Mcgrath, student NP.  I, THELMA Hubbard , personally performed the services described in this documentation as scribed by the above named individual in my presence, and it is both accurate and complete.     04/12/2023  11:55 EDT        [facility] Patient Safety Plan  This Safety Plan Should be printed, and a copy should be placed on a refrigerator, mirror, etc. for easy visibility. A copy should be kept in your billfold or purse and a copy should be on your smart phone for easy retrieval.     3/6/2025   Patient Name: Alisa Saul  YOB: 1995    Alisa participated filling out her safety plan in collaboration with THELMA Hubbard on 3/6/2025.    Suicide Prevention Hotline:   Call 630 or 1-377.133.8335 or text Talk at 177-892

## 2025-03-06 ENCOUNTER — TELEMEDICINE (OUTPATIENT)
Dept: PSYCHIATRY | Facility: CLINIC | Age: 30
End: 2025-03-06
Payer: COMMERCIAL

## 2025-03-06 ENCOUNTER — TELEPHONE (OUTPATIENT)
Dept: PSYCHIATRY | Facility: CLINIC | Age: 30
End: 2025-03-06

## 2025-03-06 DIAGNOSIS — F33.1 MODERATE RECURRENT MAJOR DEPRESSION: Primary | Chronic | ICD-10-CM

## 2025-03-06 DIAGNOSIS — G47.09 OTHER INSOMNIA: Chronic | ICD-10-CM

## 2025-03-06 DIAGNOSIS — F43.10 POST TRAUMATIC STRESS DISORDER (PTSD): Chronic | ICD-10-CM

## 2025-03-06 DIAGNOSIS — F33.1 MODERATE RECURRENT MAJOR DEPRESSION: Chronic | ICD-10-CM

## 2025-03-06 DIAGNOSIS — F41.1 GAD (GENERALIZED ANXIETY DISORDER): Chronic | ICD-10-CM

## 2025-03-06 DIAGNOSIS — F90.0 ADHD (ATTENTION DEFICIT HYPERACTIVITY DISORDER), INATTENTIVE TYPE: Chronic | ICD-10-CM

## 2025-03-06 DIAGNOSIS — Z79.899 MEDICATION MANAGEMENT: ICD-10-CM

## 2025-03-06 PROCEDURE — 99214 OFFICE O/P EST MOD 30 MIN: CPT | Performed by: NURSE PRACTITIONER

## 2025-03-06 RX ORDER — BUPROPION HYDROCHLORIDE 300 MG/1
300 TABLET ORAL EVERY MORNING
Qty: 30 TABLET | Refills: 2 | Status: SHIPPED | OUTPATIENT
Start: 2025-03-06

## 2025-03-06 RX ORDER — DEXTROAMPHETAMINE SACCHARATE, AMPHETAMINE ASPARTATE MONOHYDRATE, DEXTROAMPHETAMINE SULFATE, AMPHETAMINE SULFATE 9.375; 9.375; 9.375; 9.375 MG/1; MG/1; MG/1; MG/1
37.5 CAPSULE, EXTENDED RELEASE ORAL DAILY
Qty: 30 CAPSULE | Refills: 0 | Status: SHIPPED | OUTPATIENT
Start: 2025-03-06

## 2025-03-06 NOTE — TELEPHONE ENCOUNTER
Lindsay,    Can you call Alisa and have her complete UDS while she is in town? I placed the order for a [facility] outpatient lab. Thanks

## 2025-03-06 NOTE — TELEPHONE ENCOUNTER
[facility]

## 2025-03-07 ENCOUNTER — LAB (OUTPATIENT)
Facility: HOSPITAL | Age: 30
End: 2025-03-07
Payer: COMMERCIAL

## 2025-03-07 DIAGNOSIS — F90.0 ADHD (ATTENTION DEFICIT HYPERACTIVITY DISORDER), INATTENTIVE TYPE: ICD-10-CM

## 2025-03-07 DIAGNOSIS — Z79.899 MEDICATION MANAGEMENT: ICD-10-CM

## 2025-03-07 LAB
AMPHET+METHAMPHET UR QL: POSITIVE
AMPHETAMINES UR QL: NEGATIVE
BARBITURATES UR QL SCN: NEGATIVE
BENZODIAZ UR QL SCN: NEGATIVE
BUPRENORPHINE SERPL-MCNC: NEGATIVE NG/ML
CANNABINOIDS SERPL QL: NEGATIVE
COCAINE UR QL: NEGATIVE
FENTANYL UR-MCNC: NEGATIVE NG/ML
METHADONE UR QL SCN: NEGATIVE
OPIATES UR QL: NEGATIVE
OXYCODONE UR QL SCN: NEGATIVE
PCP UR QL SCN: NEGATIVE
TRICYCLICS UR QL SCN: NEGATIVE

## 2025-03-07 PROCEDURE — 80307 DRUG TEST PRSMV CHEM ANLYZR: CPT

## 2025-04-29 DIAGNOSIS — F90.0 ADHD (ATTENTION DEFICIT HYPERACTIVITY DISORDER), INATTENTIVE TYPE: Chronic | ICD-10-CM

## 2025-04-29 RX ORDER — DEXTROAMPHETAMINE SACCHARATE, AMPHETAMINE ASPARTATE MONOHYDRATE, DEXTROAMPHETAMINE SULFATE, AMPHETAMINE SULFATE 9.375; 9.375; 9.375; 9.375 MG/1; MG/1; MG/1; MG/1
37.5 CAPSULE, EXTENDED RELEASE ORAL DAILY
Qty: 30 CAPSULE | Refills: 0 | Status: SHIPPED | OUTPATIENT
Start: 2025-04-29

## 2025-05-01 NOTE — PROGRESS NOTES
"Chief Complaint  Depression, anxiety, PTSD, insomnia, and ADHD in the adult      Subjective          Alisa Saul presents via [employer] Video through [employer] by herself for a follow up and medication check.    History of Present Illness: Alisa states, \"a lot has happened since my last visit.\"  Alisa tells me that she elected to move back to [place], [place].  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  She tells me that she really enjoys this job and thinks she will like working with the management.  She states, \"it is kind of like my dream job.\"  Her mother was diagnosed with stage I breast cancer and had a lumpectomy.  She will be discussing her treatment plan on Monday.  Alisa will try to help as much as possible taking her to appointments if needed.  She feels she will have flexibility with her new job.  The patient's dog also underwent two surgeries since her last visit.  She admits that anxiety has been much higher through all of this.  She also had to make a decision between two jobs which further exacerbated her anxiety.  She elected to stay in her current role and is feeling a sense of relief and positivity about the choice.  She admits that she is feeling more confident when making choices.  She has seen significant improvement in depression and denies any thoughts of suicide in the last 2 weeks.  She reports good focus, concentration, and attention, but does find that Mydayis is not working as well to manage hunger as Vyvanse.  Her primary care is attempting to start a semaglutide injection weekly.  She is also planning on starting a new infusion with dermatology.  She denies any problems with sleep recently. She has tried: Vraylar, Mirtazapine, Lexapro, Zoloft, Effexor, BuSpar, Trintellix, and Wellbutrin. Currently, she is taking Mydayis, Hydroxyzine, Celexa, Lamotrigine, Wellbutrin XL and BuSpar.  She denies any side effects.  She denies any " SI/HI/AVH.    Current Medications:   Current Outpatient Medications   Medication Sig Dispense Refill    Amphet-Dextroamphet 3-Bead ER 37.5 MG capsule sustained-release 24 hr Take 1 capsule by mouth Daily. 30 capsule 0    Biotin 10 MG capsule Take 10 mg by mouth.      buPROPion XL (WELLBUTRIN XL) 300 MG 24 hr tablet Take 1 tablet by mouth Every Morning. 30 tablet 2    busPIRone (BUSPAR) 15 MG tablet TAKE 1 TABLET BY MOUTH FOUR TIMES DAILY 360 tablet 1    cefdinir (OMNICEF) 300 MG capsule Take 1 capsule by mouth.      citalopram (CeleXA) 40 MG tablet Take 1 tablet by mouth Daily. 30 tablet 2    cyclobenzaprine (FLEXERIL) 5 MG tablet Take 1 tablet by mouth 3 (Three) Times a Day.      FIBER ADULT GUMMIES PO       fluticasone (FLONASE) 50 MCG/ACT nasal spray       hydrOXYzine (ATARAX) 25 MG tablet Take 1 tablet by mouth 3 (Three) Times a Day As Needed for Anxiety. 90 tablet 2    hydrOXYzine pamoate (VISTARIL) 25 MG capsule       ISOtretinoin (ACCUTANE) 40 MG capsule Take 2 capsules by mouth Daily.      lamoTRIgine (LaMICtal) 150 MG tablet Take 1 tablet by mouth 2 (Two) Times a Day. 60 tablet 2    loratadine (Claritin) 10 MG tablet Take 1 tablet by mouth Daily.      Magnesium Oxide -Mg Supplement 500 MG tablet Take 1 tablet by mouth Daily.      melatonin 5 MG sublingual tablet sublingual tablet Place 1 tablet under the tongue Every Night. Dual action      norgestimate-ethinyl estradiol (ORTHO-CYCLEN) 0.25-35 MG-MCG per tablet Take 1 tablet by mouth Daily.      PROBIOTIC PRODUCT PO       spironolactone (ALDACTONE) 100 MG tablet Take 1.5 tablets by mouth Daily.      vitamin D (ERGOCALCIFEROL) 1.25 MG (18597 UT) capsule capsule Take 1 capsule by mouth 1 (One) Time Per Week.      Zinc 100 MG tablet Take  by mouth.      albuterol sulfate  (90 Base) MCG/ACT inhaler Inhale 2 puffs. (Patient not taking: Reported on 5/8/2025)      clindamycin (CLEOCIN T) 1 % lotion  (Patient not taking: Reported on 5/8/2025)       Semaglutide-Weight Management (Wegovy) 0.25 MG/0.5ML solution auto-injector Inject 0.5 mL under the skin into the appropriate area as directed. (Patient not taking: Reported on 5/8/2025)       No current facility-administered medications for this visit.         Objective   Vital Signs:   There were no vitals taken for this visit.    Physical Exam  Nursing note reviewed. Vitals reviewed: No vitals to review due to nature of telehealth visit.  Constitutional:       Appearance: Normal appearance. She is well-developed. She is obese.   Neurological:      General: No focal deficit present.      Mental Status: She is alert and oriented to person, place, and time.   Psychiatric:         Attention and Perception: Attention and perception normal.         Mood and Affect: Affect normal. Mood is anxious.         Speech: Speech normal.         Behavior: Behavior normal. Behavior is cooperative.         Thought Content: Thought content normal.         Cognition and Memory: Cognition and memory normal.         Judgment: Judgment normal.        Result Review :     The following data was reviewed by: THELMA Hubbard on 05/08/2025:         Assessment and Plan    Diagnoses and all orders for this visit:    1. CRISTINA (generalized anxiety disorder) (Primary)  -     citalopram (CeleXA) 40 MG tablet; Take 1 tablet by mouth Daily.  Dispense: 30 tablet; Refill: 2  -     lamoTRIgine (LaMICtal) 150 MG tablet; Take 1 tablet by mouth 2 (Two) Times a Day.  Dispense: 60 tablet; Refill: 2    2. ADHD (attention deficit hyperactivity disorder), inattentive type    3. Moderate recurrent major depression  -     citalopram (CeleXA) 40 MG tablet; Take 1 tablet by mouth Daily.  Dispense: 30 tablet; Refill: 2  -     buPROPion XL (WELLBUTRIN XL) 300 MG 24 hr tablet; Take 1 tablet by mouth Every Morning.  Dispense: 30 tablet; Refill: 2  -     lamoTRIgine (LaMICtal) 150 MG tablet; Take 1 tablet by mouth 2 (Two) Times a Day.  Dispense: 60  tablet; Refill: 2    4. Other insomnia    5. Post traumatic stress disorder (PTSD)                Mental Status Exam:   Hygiene:   good  Cooperation:  Cooperative  Eye Contact:  Good  Psychomotor Behavior:  Appropriate  Affect:  Appropriate  Mood: anxious  Speech:  Normal  Thought Process:  Goal directed and Linear  Thought Content:  Normal  Suicidal:  None  Homicidal:  None  Hallucinations:  None  Delusion:  None  Memory:  Intact  Orientation:  Person, Place, Time and Situation  Reliability:  good  Insight:  Good  Judgement:  Good  Impulse Control:  Good  Physical/Medical Issues:  Yes Hidradenitis suppurativa, chronic back pain, endometriosis, acne, and arthritis       PHQ-9 Score:   PHQ-9 Total Score: (Patient-Rptd) 14      Impression/Plan:  -This is a follow up and medication check. Alisa reports an improvement in symptoms of depression.  She has seen an exacerbation of anxiety while transitioning from living in [place] to [place].  However, she does feel more supported.  She also started a job that she is really enjoying.  She feels very supported by management.  Her mother was diagnosed with stage I breast cancer and will be undergoing treatment.  She is feeling thankful to be close by and able to help if needed.  She has been enjoying spending time with her mother.  She has been utilizing as needed medications when feeling very anxious.  She denies any problems with sleep.  She is concerned for the increase in appetite since changing from Vyvanse to Mydayis.  She reports good focus, concentration, and attention.  Primary care is going to attempt getting insurance to approve a GLP-1 for the patient.  However, if the medication is denied she may consider changing back to Vyvanse as this did manage appetite and executive dysfunction very well.  At this time, she is pleased with her current medication regimen.  We did discuss the importance of implementing healthy lifestyle practices to bring down anxiety,  along with medication.  She verbalizes understanding.  She is aware of providers change in location and agrees to being seen via telehealth in [place].  - Continue Mydayis 37.5 mg daily for ADHD symptoms. Patient has refills.  -Continue Wellbutrin  mg daily for depression.   -Continue Celexa 40 mg daily for depression and anxiety.  -Continue BuSpar 15 mg 4 times daily for anxiety. Patient will take a dose twice daily scheduled and use 1-2 doses as needed throughout the day for anxiety. Patient has refills.  -Continue Hydroxyzine 25 mg three times daily  for anxiety and sleep. Patient has refills.  -Continue lamotrigine 150 mg twice daily for depression and anxiety.   -Last urine drug screen 03/07/2025.  Appropriate.  -The DESIRAE report, reviewed through PDMP, of the past 12 months were reviewed and is appropriate.  The patient/guardian reports taking the medication only as prescribed.  The patient/guardian denies any abuse or misuse of the medication.  The patient/guardian denies any other substance use or issues.  There are no apparent substance related issues.  The patient reports no side effects of the current medication usage.  The patient/guardian has reported significant improvement with medication usage and wishes to continue medication as prescribed.  The patient/guardian is appropriate to continue with current medication usage at this time.  Reinforced risks and side effects of medication usage, patient and/or guardian verbalize understanding in their own words and are in agreement with current plan.  -Continue therapy  -Reviewed CBC, CMP, TSH, and T4 on 11/27/24.    MEDS ORDERED DURING VISIT:  New Medications Ordered This Visit   Medications    citalopram (CeleXA) 40 MG tablet     Sig: Take 1 tablet by mouth Daily.     Dispense:  30 tablet     Refill:  2    buPROPion XL (WELLBUTRIN XL) 300 MG 24 hr tablet     Sig: Take 1 tablet by mouth Every Morning.     Dispense:  30 tablet     Refill:  2     lamoTRIgine (LaMICtal) 150 MG tablet     Sig: Take 1 tablet by mouth 2 (Two) Times a Day.     Dispense:  60 tablet     Refill:  2     Follow Up   Return in about 3 months (around 8/8/2025) for Medication Check.  Patient was given instructions and counseling regarding her condition or for health maintenance advice. Please see specific information pulled into the AVS if appropriate.       TREATMENT PLAN/GOALS: Continue supportive psychotherapy efforts and medications as indicated. Treatment and medication options discussed during today's visit. Patient acknowledged and verbally consented to continue with current treatment plan and was educated on the importance of compliance with treatment and follow-up appointments.    MEDICATION ISSUES:  Discussed medication options and treatment plan of prescribed medication as well as the risks, benefits, and side effects including potential falls, possible impaired driving and metabolic adversities among others. Patient is agreeable to call the office with any worsening of symptoms or onset of side effects. Patient is agreeable to call 911 or go to the nearest ER should he/she begin having SI/HI.        This document has been electronically signed by THELMA Henriquez, PMHNP-BC  May 8, 2025 11:44 EDT    Part of this note may be an electronic transcription/translation of spoken language to printed text using the Dragon Dictation System.    This encounter note was scribed for THELMA Hubbard  by Angie Mcgrath, student NP.  I, THELMA Hubbard , personally performed the services described in this documentation as scribed by the above named individual in my presence, and it is both accurate and complete.     04/12/2023  11:55 EDT        [facility] Patient Safety Plan  This Safety Plan Should be printed, and a copy should be placed on a refrigerator, mirror, etc. for easy visibility. A copy should be kept in your billfold or purse and a copy should  be on your smart phone for easy retrieval.     5/8/2025   Patient Name: Alisa Saul  YOB: 1995    Alisa participated filling out her safety plan in collaboration with THELMA Hubbard on 5/8/2025.    Suicide Prevention Hotline:   Call 530 or 1-208.951.3293 or text Talk at 676-841

## 2025-05-08 ENCOUNTER — TELEMEDICINE (OUTPATIENT)
Dept: PSYCHIATRY | Facility: CLINIC | Age: 30
End: 2025-05-08
Payer: COMMERCIAL

## 2025-05-08 DIAGNOSIS — F33.1 MODERATE RECURRENT MAJOR DEPRESSION: Chronic | ICD-10-CM

## 2025-05-08 DIAGNOSIS — F90.0 ADHD (ATTENTION DEFICIT HYPERACTIVITY DISORDER), INATTENTIVE TYPE: Chronic | ICD-10-CM

## 2025-05-08 DIAGNOSIS — G47.09 OTHER INSOMNIA: Chronic | ICD-10-CM

## 2025-05-08 DIAGNOSIS — F43.10 POST TRAUMATIC STRESS DISORDER (PTSD): Chronic | ICD-10-CM

## 2025-05-08 DIAGNOSIS — F41.1 GAD (GENERALIZED ANXIETY DISORDER): Primary | Chronic | ICD-10-CM

## 2025-05-08 PROCEDURE — 99214 OFFICE O/P EST MOD 30 MIN: CPT | Performed by: NURSE PRACTITIONER

## 2025-05-08 RX ORDER — CEFDINIR 300 MG/1
300 CAPSULE ORAL
COMMUNITY
Start: 2025-05-07 | End: 2025-05-14

## 2025-05-08 RX ORDER — CITALOPRAM HYDROBROMIDE 40 MG/1
40 TABLET ORAL DAILY
Qty: 30 TABLET | Refills: 2 | Status: SHIPPED | OUTPATIENT
Start: 2025-05-08

## 2025-05-08 RX ORDER — NORGESTIMATE AND ETHINYL ESTRADIOL 0.25-0.035
1 KIT ORAL DAILY
COMMUNITY
Start: 2025-05-07

## 2025-05-08 RX ORDER — CYCLOBENZAPRINE HCL 5 MG
5 TABLET ORAL 3 TIMES DAILY
COMMUNITY
Start: 2025-05-07

## 2025-05-08 RX ORDER — LAMOTRIGINE 150 MG/1
150 TABLET ORAL 2 TIMES DAILY
Qty: 60 TABLET | Refills: 2 | Status: SHIPPED | OUTPATIENT
Start: 2025-05-08

## 2025-05-08 RX ORDER — BUPROPION HYDROCHLORIDE 300 MG/1
300 TABLET ORAL EVERY MORNING
Qty: 30 TABLET | Refills: 2 | Status: SHIPPED | OUTPATIENT
Start: 2025-05-08

## 2025-05-08 RX ORDER — SEMAGLUTIDE 0.25 MG/.5ML
0.25 INJECTION, SOLUTION SUBCUTANEOUS
COMMUNITY
Start: 2025-05-07

## 2025-05-30 ENCOUNTER — TELEPHONE (OUTPATIENT)
Age: 30
End: 2025-05-30
Payer: COMMERCIAL

## 2025-06-08 DIAGNOSIS — F90.0 ADHD (ATTENTION DEFICIT HYPERACTIVITY DISORDER), INATTENTIVE TYPE: Chronic | ICD-10-CM

## 2025-06-09 RX ORDER — DEXTROAMPHETAMINE SACCHARATE, AMPHETAMINE ASPARTATE MONOHYDRATE, DEXTROAMPHETAMINE SULFATE, AMPHETAMINE SULFATE 9.375; 9.375; 9.375; 9.375 MG/1; MG/1; MG/1; MG/1
37.5 CAPSULE, EXTENDED RELEASE ORAL DAILY
Qty: 30 CAPSULE | Refills: 0 | Status: SHIPPED | OUTPATIENT
Start: 2025-06-09

## 2025-06-19 NOTE — PROGRESS NOTES
"Chief Complaint  Depression, anxiety, PTSD, insomnia, and ADHD in the adult      Subjective          Alisa Salu presents via "[employer] through [employer] by herself for an initial evaluation by me in the [place] [facility]. The patient was last seen in May by me in [place].    History of Present Illness: Alisa states, \"I am okay.\" Alisa reports that depression has worsened over the last month. She reports symptoms of anhedonia, depressed mood, fatigue, decreased ability for self-care, impaired sleep, and decreased concentration at times. She continues to implement sleep hygiene measures, but still has a difficult time falling and staying asleep. She reports 4-5 hours total at most. Her anxiety remains, but has stabilized since some stressors have improved. Her mother remains in chemotherapy treatment but is tolerating and her work is giving her the flexibility to take her to appointments when needed. She reports good focus, concentration, attention, and completion of tasks at work. She is recovering from a viral illness last weekend. She has a decreased appetite from taking a semaglutide compound for weight loss. She reports compliance with her psychiatric medication. Currently, she is taking Mydayis, Hydroxyzine, Celexa, Lamotrigine, Wellbutrin XL and BuSpar.  She denies any side effects.  She has resumed birth control. She denies any SI/HI/AVH.      She has tried: Vraylar, Mirtazapine, Lexapro, Zoloft, Effexor, BuSpar, Trintellix, and Wellbutrin.     Current Medications:   Current Outpatient Medications   Medication Sig Dispense Refill    albuterol sulfate  (90 Base) MCG/ACT inhaler Inhale 2 puffs.      [START ON 7/7/2025] Amphet-Dextroamphet 3-Bead ER 37.5 MG capsule sustained-release 24 hr Take 1 capsule by mouth Daily. 30 capsule 0    Bimekizumab-bkzx (BIMZELX SC)       Biotin 10 MG capsule Take 10 mg by mouth.      buPROPion XL (WELLBUTRIN XL) 300 MG 24 hr tablet Take 1 tablet by mouth Every " Morning. 30 tablet 2    busPIRone (BUSPAR) 15 MG tablet Take 1 tablet by mouth 4 (Four) Times a Day. 360 tablet 1    citalopram (CeleXA) 40 MG tablet Take 1 tablet by mouth Daily. 30 tablet 2    clindamycin (CLEOCIN T) 1 % lotion       cyclobenzaprine (FLEXERIL) 5 MG tablet Take 1 tablet by mouth 3 (Three) Times a Day.      FIBER ADULT GUMMIES PO       fluticasone (FLONASE) 50 MCG/ACT nasal spray       hydrOXYzine (ATARAX) 25 MG tablet Take 1 tablet by mouth 3 (Three) Times a Day As Needed for Anxiety. 90 tablet 2    hydrOXYzine pamoate (VISTARIL) 25 MG capsule       ISOtretinoin (ACCUTANE) 40 MG capsule Take 2 capsules by mouth Daily.      loratadine (Claritin) 10 MG tablet Take 1 tablet by mouth Daily.      Magnesium Oxide -Mg Supplement 500 MG tablet Take 1 tablet by mouth Daily.      melatonin 5 MG sublingual tablet sublingual tablet Place 1 tablet under the tongue Every Night. Dual action      norgestimate-ethinyl estradiol (ORTHO-CYCLEN) 0.25-35 MG-MCG per tablet Take 1 tablet by mouth Daily.      ondansetron ODT (ZOFRAN-ODT) 4 MG disintegrating tablet Take 1 tablet by mouth Every 6 (Six) Hours As Needed for nausea and vomiting 12 tablet 0    PROBIOTIC PRODUCT PO       SEMAGLUTIDE-WEIGHT MANAGEMENT SC Inject  under the skin into the appropriate area as directed 1 (One) Time Per Week. With Niacinamide      spironolactone (ALDACTONE) 100 MG tablet Take 1.5 tablets by mouth Daily.      vitamin D (ERGOCALCIFEROL) 1.25 MG (94416 UT) capsule capsule Take 1 capsule by mouth 1 (One) Time Per Week.      Zinc 100 MG tablet Take  by mouth.      lamoTRIgine  MG tablet sustained-release 24 hour Take 1 tablet by mouth Every Night. 30 tablet 2     No current facility-administered medications for this visit.         Objective   Vital Signs:   There were no vitals taken for this visit.    Physical Exam  Nursing note reviewed. Vitals reviewed: No vitals to review due to nature of telehealth visit.  Constitutional:        Appearance: Normal appearance. She is well-developed. She is obese.   Neurological:      General: No focal deficit present.      Mental Status: She is alert and oriented to person, place, and time.   Psychiatric:         Attention and Perception: Attention and perception normal.         Mood and Affect: Affect normal. Mood is depressed.         Speech: Speech normal.         Behavior: Behavior is slowed. Behavior is cooperative.         Thought Content: Thought content normal.         Cognition and Memory: Cognition and memory normal.         Judgment: Judgment normal.        Result Review :     The following data was reviewed by: THELMA Hubbard on 07/02/2025:         Assessment and Plan    Diagnoses and all orders for this visit:    1. Moderate recurrent major depression (Primary)  -     lamoTRIgine  MG tablet sustained-release 24 hour; Take 1 tablet by mouth Every Night.  Dispense: 30 tablet; Refill: 2    2. CRISTINA (generalized anxiety disorder)  -     busPIRone (BUSPAR) 15 MG tablet; Take 1 tablet by mouth 4 (Four) Times a Day.  Dispense: 360 tablet; Refill: 1  -     lamoTRIgine  MG tablet sustained-release 24 hour; Take 1 tablet by mouth Every Night.  Dispense: 30 tablet; Refill: 2    3. Other insomnia    4. ADHD (attention deficit hyperactivity disorder), inattentive type  -     Amphet-Dextroamphet 3-Bead ER 37.5 MG capsule sustained-release 24 hr; Take 1 capsule by mouth Daily.  Dispense: 30 capsule; Refill: 0    5. Post traumatic stress disorder (PTSD)                  Mental Status Exam:   Hygiene:   good  Cooperation:  Cooperative  Eye Contact:  Good  Psychomotor Behavior:  Slow  Affect:  Appropriate  Mood: depressed  Speech:  Normal  Thought Process:  Goal directed and Linear  Thought Content:  Normal  Suicidal:  None  Homicidal:  None  Hallucinations:  None  Delusion:  None  Memory:  Intact  Orientation:  Person, Place, Time and Situation  Reliability:  good  Insight:   Good  Judgement:  Good  Impulse Control:  Good  Physical/Medical Issues:  Yes Hidradenitis suppurativa, chronic back pain, endometriosis, acne, and arthritis      PHQ-9 Score:   PHQ-9 Total Score:        Impression/Plan:  -This is an initial evaluation of the patient in the [place] clinic. Alisa reports a decline in mood despite improved stressors. Anxiety remains. Focus, concentration, and attention is good at work. She has impaired sleep as well. Today, we discussed her current regiment and possible dose adjustments to improve mood and continue managing anxiety. She remains in therapy as well. I suggested changing Lamotrigine to ER for lasting and steady-state benefits of medication. This is a CNS depressant and may help sleep as well, but I reminded the patient about optimizing as needed medication for sleep if needed. She verbalizes agreement to medication change and to instructions for sleep. We will follow up in two months to assess benefits. [employer] message sent to remind patient about effects of Lamotrigine on birth control and birth control on Lamotrigine.   Change lamotrigine to  mg nightly for depression and anxiety.   -Continue Mydayis 37.5 mg daily for ADHD symptoms.   -Continue Wellbutrin  mg daily for depression. Patient has refills.  -Continue Celexa 40 mg daily for depression and anxiety. Patient has refills.  -Continue BuSpar 15 mg 4 times daily for anxiety. Patient will take a dose twice daily scheduled and use 1-2 doses as needed throughout the day for anxiety.   -Continue Hydroxyzine 25 mg three times daily  for anxiety and sleep. Patient has refills.  -Last urine drug screen 03/07/2025.  Appropriate.  -The DESIRAE report, reviewed through PDMP, of the past 12 months were reviewed and is appropriate.  The patient/guardian reports taking the medication only as prescribed.  The patient/guardian denies any abuse or misuse of the medication.  The patient/guardian denies any other  substance use or issues.  There are no apparent substance related issues.  The patient reports no side effects of the current medication usage.  The patient/guardian has reported significant improvement with medication usage and wishes to continue medication as prescribed.  The patient/guardian is appropriate to continue with current medication usage at this time.  Reinforced risks and side effects of medication usage, patient and/or guardian verbalize understanding in their own words and are in agreement with current plan.  -Continue therapy  -Reviewed CBC, CMP, TSH, and T4 on 11/27/24.  -I personally reviewed the progress note of THELMA Ocampo on 06/06/25 in regard to risk of breast cancer.     MEDS ORDERED DURING VISIT:  New Medications Ordered This Visit   Medications    busPIRone (BUSPAR) 15 MG tablet     Sig: Take 1 tablet by mouth 4 (Four) Times a Day.     Dispense:  360 tablet     Refill:  1     **Patient requests 90 days supply**    Amphet-Dextroamphet 3-Bead ER 37.5 MG capsule sustained-release 24 hr     Sig: Take 1 capsule by mouth Daily.     Dispense:  30 capsule     Refill:  0    lamoTRIgine  MG tablet sustained-release 24 hour     Sig: Take 1 tablet by mouth Every Night.     Dispense:  30 tablet     Refill:  2       Follow Up   Return in about 2 months (around 9/2/2025) for Medication Check.  Patient was given instructions and counseling regarding her condition or for health maintenance advice. Please see specific information pulled into the AVS if appropriate.       TREATMENT PLAN/GOALS: Continue supportive psychotherapy efforts and medications as indicated. Treatment and medication options discussed during today's visit. Patient acknowledged and verbally consented to continue with current treatment plan and was educated on the importance of compliance with treatment and follow-up appointments.    MEDICATION ISSUES:  Discussed medication options and treatment plan of prescribed medication  as well as the risks, benefits, and side effects including potential falls, possible impaired driving and metabolic adversities among others. Patient is agreeable to call the office with any worsening of symptoms or onset of side effects. Patient is agreeable to call 911 or go to the nearest ER should he/she begin having SI/HI.        This document has been electronically signed by THELMA Henriquez, PMHNP-BC  July 2, 2025 08:54 EDT    Part of this note may be an electronic transcription/translation of spoken language to printed text using the Dragon Dictation System.    This encounter note was scribed for THELMA Hubbard  by Angie Mcgrath, student NP.  I, THELMA Hubbard , personally performed the services described in this documentation as scribed by the above named individual in my presence, and it is both accurate and complete.     04/12/2023  11:55 EDT        [facility] Patient Safety Plan  This Safety Plan Should be printed, and a copy should be placed on a refrigerator, mirror, etc. for easy visibility. A copy should be kept in your billfold or purse and a copy should be on your smart phone for easy retrieval.     7/2/2025   Patient Name: Alisa Saul  YOB: 1995    Alisa participated filling out her safety plan in collaboration with THELMA Hubbard on 7/2/2025.    Suicide Prevention Hotline:   Call 340 or 1-895.166.7185 or text Talk at 828-308

## 2025-06-30 DIAGNOSIS — F41.1 GAD (GENERALIZED ANXIETY DISORDER): Chronic | ICD-10-CM

## 2025-07-02 ENCOUNTER — TELEMEDICINE (OUTPATIENT)
Age: 30
End: 2025-07-02
Payer: COMMERCIAL

## 2025-07-02 DIAGNOSIS — F33.1 MODERATE RECURRENT MAJOR DEPRESSION: Primary | Chronic | ICD-10-CM

## 2025-07-02 DIAGNOSIS — G47.09 OTHER INSOMNIA: Chronic | ICD-10-CM

## 2025-07-02 DIAGNOSIS — F90.0 ADHD (ATTENTION DEFICIT HYPERACTIVITY DISORDER), INATTENTIVE TYPE: Chronic | ICD-10-CM

## 2025-07-02 DIAGNOSIS — F41.1 GAD (GENERALIZED ANXIETY DISORDER): Chronic | ICD-10-CM

## 2025-07-02 DIAGNOSIS — F43.10 POST TRAUMATIC STRESS DISORDER (PTSD): Chronic | ICD-10-CM

## 2025-07-02 PROCEDURE — 90792 PSYCH DIAG EVAL W/MED SRVCS: CPT | Performed by: NURSE PRACTITIONER

## 2025-07-02 RX ORDER — DEXTROAMPHETAMINE SACCHARATE, AMPHETAMINE ASPARTATE MONOHYDRATE, DEXTROAMPHETAMINE SULFATE, AMPHETAMINE SULFATE 9.375; 9.375; 9.375; 9.375 MG/1; MG/1; MG/1; MG/1
37.5 CAPSULE, EXTENDED RELEASE ORAL DAILY
Qty: 30 CAPSULE | Refills: 0 | Status: SHIPPED | OUTPATIENT
Start: 2025-07-07

## 2025-07-02 RX ORDER — BUSPIRONE HYDROCHLORIDE 15 MG/1
15 TABLET ORAL 4 TIMES DAILY
Qty: 360 TABLET | Refills: 1 | OUTPATIENT
Start: 2025-07-02

## 2025-07-02 RX ORDER — LAMOTRIGINE 300 MG/1
300 TABLET, EXTENDED RELEASE ORAL NIGHTLY
Qty: 30 TABLET | Refills: 2 | Status: SHIPPED | OUTPATIENT
Start: 2025-07-02

## 2025-07-02 RX ORDER — BUSPIRONE HYDROCHLORIDE 15 MG/1
15 TABLET ORAL 4 TIMES DAILY
Qty: 360 TABLET | Refills: 1 | Status: SHIPPED | OUTPATIENT
Start: 2025-07-02

## 2025-07-02 NOTE — TELEPHONE ENCOUNTER
Sent via [employer].  
Will discuss at appointment this morning.   
comfortable appearance/cooperative
comfortable appearance

## 2025-07-10 ENCOUNTER — SPECIALTY PHARMACY (OUTPATIENT)
Dept: PHARMACY | Facility: TELEHEALTH | Age: 30
End: 2025-07-10
Payer: COMMERCIAL

## 2025-07-10 NOTE — PROGRESS NOTES
Specialty Pharmacy Patient Management Program  Initial Assessment     Alisa Saul is a 29 y.o. female with hidradenitis suppurativa and enrolled in the Patient Management program offered by [facility]. An initial outreach was conducted, including assessment of therapy appropriateness and specialty medication education for Bimzelx. The patient was introduced to services offered by [facility], including: regular assessments, refill coordination, curbside pick-up or mail order delivery options, prior authorization maintenance, and financial assistance programs as applicable. The patient was also provided with contact information for the pharmacy team.     Insurance Coverage & Financial Support  Affirmed + copay card     Relevant Past Medical History and Comorbidities  Relevant medical history and concomitant health conditions were discussed with the patient. The patient's chart has been reviewed for relevant past medical history and comorbid health conditions and updated as necessary.   Past Medical History:   Diagnosis Date    ADHD (attention deficit hyperactivity disorder)     Anxiety     Chronic pain disorder     Depression     Difficulty concentrating     Hyperactive     Sleep difficulties     Suicidal ideation      Social History     Socioeconomic History    Marital status: Single   Tobacco Use    Smoking status: Never     Passive exposure: Never    Smokeless tobacco: Never   Vaping Use    Vaping status: Never Used   Substance and Sexual Activity    Alcohol use: Yes     Comment: maybe 1-2 times a month    Drug use: Never    Sexual activity: Not Currently     Partners: Male     Birth control/protection: Condom, Pill     Problem list reviewed by Jo Boyd RPH on 7/10/2025 at  2:10 PM    Allergies  Known allergies and reactions were discussed with the patient. The patient's chart has been reviewed for allergy information and updated as necessary.    Penicillins  Allergies reviewed by Jo Boyd [facility] on 7/10/2025 at  2:10 PM    Current Medication List  This medication list has been reviewed with the patient and evaluated for any interactions or necessary modifications/recommendations, and updated to include all prescription medications, OTC medications, and supplements the patient is currently taking. This list reflects what is contained in the patient's profile, which has also been marked as reviewed to communicate to other providers it is the most up to date version of the patient's current medication therapy.     Current Outpatient Medications:     albuterol sulfate  (90 Base) MCG/ACT inhaler, Inhale 2 puffs., Disp: , Rfl:     Amphet-Dextroamphet 3-Bead ER 37.5 MG capsule sustained-release 24 hr, Take 1 capsule by mouth Daily., Disp: 30 capsule, Rfl: 0    Bimekizumab-bkzx (BIMZELX SC), , Disp: , Rfl:     Bimekizumab-bkzx (Bimzelx) 320 MG/2ML solution auto-injector, Inject 320 mg under the skin into the appropriate area as directed Every 14 (Fourteen) Days. For 16 weeks, then every 4 weeks after., Disp: 4 mL, Rfl: 3    Bimekizumab-bkzx (Bimzelx) 320 MG/2ML solution auto-injector, Inject 320 mg under the skin into the appropriate area as directed Every 28 (Twenty-Eight) Days., Disp: 2 mL, Rfl: 5    Biotin 10 MG capsule, Take 10 mg by mouth., Disp: , Rfl:     buPROPion XL (WELLBUTRIN XL) 300 MG 24 hr tablet, Take 1 tablet by mouth Every Morning., Disp: 30 tablet, Rfl: 2    busPIRone (BUSPAR) 15 MG tablet, Take 1 tablet by mouth 4 (Four) Times a Day., Disp: 360 tablet, Rfl: 1    citalopram (CeleXA) 40 MG tablet, Take 1 tablet by mouth Daily., Disp: 30 tablet, Rfl: 2    clindamycin (CLEOCIN T) 1 % lotion, , Disp: , Rfl:     cyclobenzaprine (FLEXERIL) 5 MG tablet, Take 1 tablet by mouth 3 (Three) Times a Day., Disp: , Rfl:     FIBER ADULT GUMMIES PO, , Disp: , Rfl:     fluticasone (FLONASE) 50 MCG/ACT nasal spray, , Disp: , Rfl:     hydrOXYzine (ATARAX)  25 MG tablet, Take 1 tablet by mouth 3 (Three) Times a Day As Needed for Anxiety., Disp: 90 tablet, Rfl: 2    hydrOXYzine pamoate (VISTARIL) 25 MG capsule, , Disp: , Rfl:     ISOtretinoin (ACCUTANE) 40 MG capsule, Take 2 capsules by mouth Daily., Disp: , Rfl:     lamoTRIgine  MG tablet sustained-release 24 hour, Take 1 tablet by mouth Every Night., Disp: 30 tablet, Rfl: 2    loratadine (Claritin) 10 MG tablet, Take 1 tablet by mouth Daily., Disp: , Rfl:     Magnesium Oxide -Mg Supplement 500 MG tablet, Take 1 tablet by mouth Daily., Disp: , Rfl:     melatonin 5 MG sublingual tablet sublingual tablet, Place 1 tablet under the tongue Every Night. Dual action, Disp: , Rfl:     norgestimate-ethinyl estradiol (ORTHO-CYCLEN) 0.25-35 MG-MCG per tablet, Take 1 tablet by mouth Daily., Disp: , Rfl:     ondansetron ODT (ZOFRAN-ODT) 4 MG disintegrating tablet, Take 1 tablet by mouth Every 6 (Six) Hours As Needed for nausea and vomiting, Disp: 12 tablet, Rfl: 0    PROBIOTIC PRODUCT PO, , Disp: , Rfl:     SEMAGLUTIDE-WEIGHT MANAGEMENT SC, Inject  under the skin into the appropriate area as directed 1 (One) Time Per Week. With Niacinamide, Disp: , Rfl:     spironolactone (ALDACTONE) 100 MG tablet, Take 1.5 tablets by mouth Daily., Disp: , Rfl:     vitamin D (ERGOCALCIFEROL) 1.25 MG (74087 UT) capsule capsule, Take 1 capsule by mouth 1 (One) Time Per Week., Disp: , Rfl:     Zinc 100 MG tablet, Take  by mouth., Disp: , Rfl:   Medicines reviewed by Jo Boyd [facility] on 7/10/2025 at  2:10 PM    Drug Interactions  none     Relevant Laboratory Values  Lab Results   Component Value Date    GLUCOSE 75 11/27/2024    CALCIUM 9.8 11/27/2024     11/27/2024    K 4.3 11/27/2024    CO2 24.0 11/27/2024     11/27/2024    BUN 11 11/27/2024    CREATININE 0.86 11/27/2024    BCR 12.8 11/27/2024    ANIONGAP 10.0 11/27/2024     Lab Results   Component Value Date    WBC 9.90 11/27/2024    HGB 14.3 11/27/2024    HCT 41.8 11/27/2024     MCV 90.9 11/27/2024     11/27/2024     Lab Value Review  The above lab values have been reviewed; the following specialty medication(s) dose adjustment(s) are recommended: none.    Initial Education Provided for Specialty Medication  The patient has been provided with the following education and any applicable administration techniques (i.e. self-injection) have been demonstrated for the therapies indicated. All questions and concerns have been addressed prior to the patient receiving the medication, and the patient has verbalized understanding of the education and any materials provided. Additional patient education shall be provided and documented upon request by the patient, provider or payer.           Bimzelx (Bimekizumab)           Medication Expectations   Why am I taking this medication? This medication can be used for the following indications:     Ankylosing spondylitis  Axial spondyloarthritis, nonradiographic  Hidradenitis suppurativa, moderate to severe  Plaque psoriasis, moderate to severe  Psoriatic arthritis   What should I expect while on this medication? You should expect a decrease in the frequency and severity of symptoms.   How does the medication work? ?im?kizum?b is a humanized IgG1/k monoclonal antibody that selectively binds with the interleukin 17A (IL-17A), interleukin 17F (IL-17F), and interleukin 17AF (IL-17AF) cytokines and inhibits its interaction with the IL-17 receptor complex. IL-17A and IL-17F are naturally occurring cytokines that are involved in normal inflammatory and immune responses. ?imekizumab inhibits the release of proinflammatory cytokines and chemokines.   How long will I be on this medication for? The amount of time you will be on this medication will be determined by your doctor and your response to the medication.    How do I take this medication? Bimekizumab is available as a prefilled syringe and as an autoinjector.  The product is intended for use under the  guidance and supervision of a physician.  Patients may self-inject or caregivers may give subcutaneous injections after proper training.  If 2 separate 160 mg injections are used to achieve the recommended dose, administer each injection subcutaneously at a different anatomical site.  Administration sites for patient administration include the upper arms, thighs, and any quadrant of the abdomen. Health care professionals or caregivers may inject in the upper, outer arm.  Do not administer where the skin is tender, bruised, red, hard, thick, scaly, or affected by psoriasis.  Rotate sites of injection with each dose. Injections should be administered at a different location than was used for the previous injection.  Wash hands with soap and water before drug administration. Clean the injection site with an alcohol wipe and let dry.  Bimekizumab does not contain preservatives; therefore, discard any unused product remaining in the prefilled syringe or autoinjector. Discard the single-dose autoinjector or syringe after use in a proper puncture-resistant container.   What are some possible side effects? >10%:  Immunologic: Antibody development (44% to 59%; neutralizin% to 63%)    Infection: Infection (27% to 36%; including candidiasis [4% to 7%], herpes simplex infection [1%], oral candidiasis [2% to 9%], serious infection [<1%], upper respiratory tract infection [14% to 15%], urinary tract infection [<=3%])    1% to 10%:    Dermatologic: Acne vulgaris (1%), folliculitis (1%), skin rash (3%), tinea (3%)    Gastrointestinal: Diarrhea (3%), gastroenteritis (2%), stomatitis (1%)    Genitourinary: Vulvovaginal candidiasis (2%)    Hematologic & oncologic: Neutropenia (<=1%)    Hepatic: Increased serum transaminases (1% to 2%)    Local: Injection-site reaction (3%; including bruising at injection site, erythema at injection site, pain at injection site, swelling at injection site)    Nervous system: Fatigue (1% to 2%),  headache (3% to 4%), suicidal ideation (2%; including suicidal tendencies)    Neuromuscular & skeletal: Musculoskeletal pain (2%), myalgia (2%)    Respiratory: Bronchitis (2%), cough (2%), nasopharyngitis (<=3%), oropharyngeal pain (1%), tonsillitis (2%)    <1%:    Dermatologic: Eczema    Otic: Otitis externa, otitis media    Miscellaneous: Fever    Frequency not defined: Gastrointestinal: Inflammatory bowel disease (including Crohn disease, ulcerative colitis)    Postmarketing:    Gastrointestinal: Esophageal candidiasis    Ophthalmic: Conjunctivitis     What happens if I miss a dose? If a dose or administration time is missed, administer the missed dose as soon as possible. Thereafter, resume dosing at the regularly scheduled time                  Medication Safety   What are things I should warn my doctor immediately about?  are being treated for an infection    have an infection that does not go away or keeps coming back    have TB or have been in close contact with someone with TB    think you have an infection or have symptoms of an infection such as:o fever, sweats, or chills o weight loss   o muscle aches o warm, red, or painful skin or sores on your body different from your psoriasis   o cough o diarrhea or stomach pain   o shortness of breath o burning when you urinate or urinating more often than normal   o blood in your phlegm    What are things that I should be cautious of? Suicidal thoughts and behavior, infections have happened in patients taking Bimzelx.   What are some medications that can interact with this one? Immunosuppressants and vaccines.            Medication Storage/Handling   How should I handle this medication? Keep this medication our of reach of pets/children in original container. Store in the original container to protect from light. Do not freeze or shake. Do not inject where the skin is tender, bruised, red, hard, or where there are moles, scars, or stretch marks.   How does this  medication need to be stored? Store in refrigerator and keep dry. If needed, you may store at room temperature for up to 24 hours. Do not return to fridge once stored at room temperature.    How should I dispose of this medication? You can dispose of the syringe in a sharps container, and if this is not available you may use an empty hard plastic container such as a milk jug or tide container. Discard any unused portion or if stored outside of refrigerator > 30 days.            Resources/Support   How can I remind myself to take this medication? You can download a reminder amina on your phone or use a calandar  to help with your injection.   Is financial support available?  Yes, Bimzelx can provide co-pay assisstance if you have commercial insurance or patient assistance if you have Medicare or no insurance.    Which vaccines are recommended for me? Talk to your doctor about these vaccines: Flu, Coronavirus (COVID-19), Pneumococcal (pneumonia), Tdap, Hepatitis B, Zoster (shingles), live vaccines should not be co-administered.             Adherence, Self-Administration, and Current Therapy Problems  Adherence related to the patient's specialty therapy was discussed with the patient. The Adherence segment of this outreach has been reviewed and updated.          Additional Barriers to Patient Self-Administration: none identified  Methods for Supporting Patient Self-Administration: n/a    Open Medication Therapy Problems  No medication therapy recommendations to display    Goals of Therapy   Goals Addressed Today        Specialty Pharmacy General Goal      A reduction in BSA of skin lesions on the body.                Reassessment Plan & Follow-Up  Medication Therapy Changes: Patient received 3 injections as samples from the office. She has 1 remaining that she will take on 7/14.  Additional Plans, Therapy Recommendations, or Therapy Problems to Be Addressed: none   Pharmacist to perform regular reassessments no more than  (6) months from the previous assessment.  Welcome information and patient satisfaction survey to be sent by retail team with patient's initial fill.  Care Coordinator to set up future refill outreaches, coordinate prescription delivery, and escalate clinical questions to pharmacist.     Attestation  I attest the patient was actively involved in and has agreed to the above plan of care. I attest that the initiated specialty medication(s) are appropriate for the patient based on my assessment. If the prescribed therapy is at any point deemed not appropriate based on the current or future assessments, a consultation will be initiated with the patient's specialty care provider to determine the best course of action. The revised plan of therapy will be documented along with any reassessments and/or additional patient education provided.     Electronically signed by Jo Boyd RPH, 07/10/25, 2:11 PM EDT.

## 2025-07-28 DIAGNOSIS — F33.1 MODERATE RECURRENT MAJOR DEPRESSION: Chronic | ICD-10-CM

## 2025-07-28 RX ORDER — BUPROPION HYDROCHLORIDE 300 MG/1
300 TABLET ORAL EVERY MORNING
Qty: 30 TABLET | Refills: 2 | Status: SHIPPED | OUTPATIENT
Start: 2025-07-28

## 2025-08-07 ENCOUNTER — SPECIALTY PHARMACY (OUTPATIENT)
Dept: PHARMACY | Facility: TELEHEALTH | Age: 30
End: 2025-08-07
Payer: COMMERCIAL

## 2025-08-17 DIAGNOSIS — F90.0 ADHD (ATTENTION DEFICIT HYPERACTIVITY DISORDER), INATTENTIVE TYPE: Chronic | ICD-10-CM

## 2025-08-18 RX ORDER — DEXTROAMPHETAMINE SACCHARATE, AMPHETAMINE ASPARTATE MONOHYDRATE, DEXTROAMPHETAMINE SULFATE, AMPHETAMINE SULFATE 9.375; 9.375; 9.375; 9.375 MG/1; MG/1; MG/1; MG/1
37.5 CAPSULE, EXTENDED RELEASE ORAL DAILY
Qty: 30 CAPSULE | Refills: 0 | Status: SHIPPED | OUTPATIENT
Start: 2025-08-18

## 2025-08-28 ENCOUNTER — SPECIALTY PHARMACY (OUTPATIENT)
Dept: PHARMACY | Facility: TELEHEALTH | Age: 30
End: 2025-08-28
Payer: COMMERCIAL

## 2025-08-28 DIAGNOSIS — F41.1 GAD (GENERALIZED ANXIETY DISORDER): Chronic | ICD-10-CM

## 2025-08-28 DIAGNOSIS — F33.1 MODERATE RECURRENT MAJOR DEPRESSION: Chronic | ICD-10-CM

## 2025-08-28 RX ORDER — LAMOTRIGINE 300 MG/1
300 TABLET, EXTENDED RELEASE ORAL NIGHTLY
Qty: 30 TABLET | Refills: 2 | Status: SHIPPED | OUTPATIENT
Start: 2025-08-28